# Patient Record
Sex: MALE | Race: WHITE | NOT HISPANIC OR LATINO | Employment: FULL TIME | URBAN - NONMETROPOLITAN AREA
[De-identification: names, ages, dates, MRNs, and addresses within clinical notes are randomized per-mention and may not be internally consistent; named-entity substitution may affect disease eponyms.]

---

## 2018-12-10 LAB — HBA1C MFR BLD HPLC: 5.3 %

## 2019-02-04 ENCOUNTER — OFFICE VISIT (OUTPATIENT)
Dept: INTERNAL MEDICINE CLINIC | Facility: CLINIC | Age: 44
End: 2019-02-04
Payer: COMMERCIAL

## 2019-02-04 VITALS
TEMPERATURE: 99.3 F | DIASTOLIC BLOOD PRESSURE: 70 MMHG | OXYGEN SATURATION: 98 % | WEIGHT: 221.1 LBS | HEART RATE: 69 BPM | SYSTOLIC BLOOD PRESSURE: 110 MMHG

## 2019-02-04 DIAGNOSIS — R20.2 NUMBNESS AND TINGLING IN BOTH HANDS: ICD-10-CM

## 2019-02-04 DIAGNOSIS — E78.2 MIXED HYPERLIPIDEMIA: Primary | ICD-10-CM

## 2019-02-04 DIAGNOSIS — Z87.39 HISTORY OF GOUT: ICD-10-CM

## 2019-02-04 DIAGNOSIS — K40.90 LEFT INGUINAL HERNIA: ICD-10-CM

## 2019-02-04 DIAGNOSIS — R20.0 NUMBNESS AND TINGLING IN BOTH HANDS: ICD-10-CM

## 2019-02-04 DIAGNOSIS — E55.9 VITAMIN D DEFICIENCY: ICD-10-CM

## 2019-02-04 PROCEDURE — 1036F TOBACCO NON-USER: CPT | Performed by: NURSE PRACTITIONER

## 2019-02-04 PROCEDURE — 99204 OFFICE O/P NEW MOD 45 MIN: CPT | Performed by: NURSE PRACTITIONER

## 2020-06-08 ENCOUNTER — TELEPHONE (OUTPATIENT)
Dept: INTERNAL MEDICINE CLINIC | Facility: CLINIC | Age: 45
End: 2020-06-08

## 2020-06-08 ENCOUNTER — OFFICE VISIT (OUTPATIENT)
Dept: INTERNAL MEDICINE CLINIC | Facility: CLINIC | Age: 45
End: 2020-06-08
Payer: COMMERCIAL

## 2020-06-08 VITALS
TEMPERATURE: 98.4 F | WEIGHT: 199.7 LBS | OXYGEN SATURATION: 98 % | HEIGHT: 73 IN | SYSTOLIC BLOOD PRESSURE: 108 MMHG | DIASTOLIC BLOOD PRESSURE: 72 MMHG | HEART RATE: 57 BPM | BODY MASS INDEX: 26.47 KG/M2
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
## 2020-06-08 DIAGNOSIS — S30.861A TICK BITE OF ABDOMEN, INITIAL ENCOUNTER: Primary | ICD-10-CM

## 2020-06-08 PROCEDURE — 99213 OFFICE O/P EST LOW 20 MIN: CPT | Performed by: NURSE PRACTITIONER

## 2020-06-08 PROCEDURE — 1036F TOBACCO NON-USER: CPT | Performed by: NURSE PRACTITIONER

## 2020-06-08 PROCEDURE — 3008F BODY MASS INDEX DOCD: CPT | Performed by: NURSE PRACTITIONER

## 2020-06-08 RX ORDER — DOXYCYCLINE 100 MG/1
100 CAPSULE ORAL 2 TIMES DAILY
Qty: 28 CAPSULE | Refills: 0 | Status: SHIPPED | OUTPATIENT
Start: 2020-06-08 | End: 2020-06-22

## 2020-07-07 ENCOUNTER — OFFICE VISIT (OUTPATIENT)
Dept: INTERNAL MEDICINE CLINIC | Facility: CLINIC | Age: 45
End: 2020-07-07
Payer: COMMERCIAL

## 2020-07-07 VITALS
DIASTOLIC BLOOD PRESSURE: 78 MMHG | OXYGEN SATURATION: 98 % | WEIGHT: 200 LBS | TEMPERATURE: 97.7 F | SYSTOLIC BLOOD PRESSURE: 118 MMHG | RESPIRATION RATE: 18 BRPM | HEART RATE: 67 BPM | BODY MASS INDEX: 26.39 KG/M2

## 2020-07-07 DIAGNOSIS — M10.211: Primary | ICD-10-CM

## 2020-07-07 DIAGNOSIS — K40.90 LEFT INGUINAL HERNIA: ICD-10-CM

## 2020-07-07 PROCEDURE — 1036F TOBACCO NON-USER: CPT | Performed by: NURSE PRACTITIONER

## 2020-07-07 PROCEDURE — 99213 OFFICE O/P EST LOW 20 MIN: CPT | Performed by: NURSE PRACTITIONER

## 2020-07-07 RX ORDER — COLCHICINE 0.6 MG/1
TABLET ORAL
Qty: 3 TABLET | Refills: 0 | Status: SHIPPED | OUTPATIENT
Start: 2020-07-07 | End: 2021-09-15 | Stop reason: ALTCHOICE

## 2020-07-07 NOTE — PATIENT INSTRUCTIONS
Gout   WHAT YOU NEED TO KNOW:   What is gout? Gout is a form of arthritis that causes severe joint pain and stiffness  Acute gout pain starts suddenly, gets worse quickly, and stops on its own  Acute gout can become chronic and cause permanent damage to your joints  What causes gout? Gout develops when uric acid builds up in your joints  Uric acid is made when your body breaks down purines  Purines are found in some medicines and foods  Your body gets rid of most uric acid through your urine  When your body cannot get rid of enough uric acid, it can build up and form crystals in your joints  The crystals cause your joints to become swollen and painful  This is called a gout attack  What increases my risk for gout? You may have been born with a decreased ability to break down and get rid of purines  Your body's ability to break down purines may be very slow  Gout is more common in men than in women  Any of the following can also increase your risk:  · A family history of gout    · Kidney disease or problems with how your kidneys work     · Eating foods that are high in purines, such as red meat    · Alcohol or tobacco use    · Diuretic medicine (water pills), or aspirin    · A medical condition such as diabetes, high blood pressure, or high cholesterol    · A condition such as an irregular heartbeat or a blood clot in your lungs  What are the stages of gout? · Hyperuricemia  is a high level of uric acid  Hyperuricemia is not gout, but it increases your risk for gout  You may have no symptoms at this stage, and it usually does not need treatment  · Acute gouty arthritis  starts with a sudden attack of pain and swelling, usually in 1 joint  This may be in your big toe  The attack may last from a few days to 2 weeks  · Intercritical gout  is the time between attacks  You may go months or years without another attack  You will not have joint pain or stiffness, but this does not mean your gout is cured  You will still need treatment to prevent chronic gout  · Chronic tophaceous gout  develops if gout is not treated  Large amounts of uric acid crystals, called tophi, collect around your joints  The crystals can destroy or deform the joints  Gout attacks occur more often, and last hours to weeks  More than 1 joint may be painful and swollen  At this stage, gout symptoms do not go away on their own  How is gout diagnosed? Your healthcare provider will ask about your medicines, health problems, and allergies  Tell him or her when your joint pain and swelling started  He or she will need to know if the swelling and pain were worst within 1 day or if got worse over time  He or she will check the skin over your joints for redness  You may also need any of the following:  · Blood tests  are used to check the level of uric acid  You may need to have blood tested more than once  · A synovial fluid test  is used to collect a sample of fluid from around your painful joint  The fluid is sent to a lab to check for uric acid crystals  Synovial fluid surrounds and protects your joints  · An x-ray, ultrasound, CT, or MRI  may show the gout or damage to bones caused by gout  You may be given contrast liquid to help your joints show up better in the pictures  Tell the healthcare provider if you have ever had an allergic reaction to contrast liquid  Do not enter the MRI room with anything metal  Metal can cause serious injury  Tell the healthcare provider if you have any metal in or on your body  How is gout treated? The following can make your symptoms stop sooner, prevent attacks, and decrease your risk for joint damage:  · Medicines:      ¨ NSAIDs , such as ibuprofen, help decrease swelling, pain, and fever  This medicine is available with or without a doctor's order  NSAIDs can cause stomach bleeding or kidney problems in certain people   If you take blood thinner medicine, always ask your healthcare provider if NSAIDs are safe for you  Always read the medicine label and follow directions  ¨ Gout medicine  decreases joint pain and swelling  It may also be given to prevent new gout attacks  ¨ Steroids  reduce inflammation and can help your joint stiffness and pain during gout attacks  ¨ Uric acid medicine  may be given to reduce the amount of uric acid your body makes  Some medicines may help you pass more uric acid when you urinate  · Surgery  called a bone graft may be needed for tophi that are painful or infected  Bone in the joint may be replaced with bone taken from another place in your body  Ask your healthcare provider for more information about bone graft surgery  How can I manage gout? · Rest your painful joint so it can heal   Your healthcare provider may recommend crutches or a walker if the affected joint is in a leg  · Apply ice to your joint  Ice decreases pain and swelling  Use an ice pack, or put crushed ice in a plastic bag  Cover the ice pack or bag with a towel before you apply it to your painful joint  Apply ice for 15 to 20 minutes every hour, or as directed  · Elevate your joint  Elevation helps reduce swelling and pain  Raise your joint above the level of your heart as often as you can  Prop your painful joint on pillows to keep it above your heart comfortably  · Go to physical therapy if directed  A physical therapist can teach you exercises to improve flexibility and range of motion  How can I prevent gout attacks? · Do not eat high-purine foods  These foods include meats, seafood, asparagus, spinach, cauliflower, and some types of beans  Healthcare providers may tell you to eat more low-fat milk products, such as yogurt  Milk products may decrease your risk for gout attacks  Vitamin C and coffee may also help  Your healthcare provider or dietitian can help you create a meal plan  · Drink more liquids as directed    Liquids such as water help remove uric acid from your body  Ask how much liquid to drink each day and which liquids are best for you  · Manage your weight  Weight loss may decrease the amount of uric acid in your body  Exercise can help you lose weight  Talk to your healthcare provider about the best exercises for you  · Control your blood sugar level if you have diabetes  Keep your blood sugar level in a normal range  This can help prevent gout attacks  · Limit or do not drink alcohol as directed  Alcohol can trigger a gout attack  Alcohol also increases your risk for dehydration  Ask your healthcare provider if alcohol is safe for you  When should I seek immediate care? · You have severe joint pain that you cannot tolerate  · You have a fever or redness that spreads beyond the joint area  When should I contact my healthcare provider? · You have a fever, chills, or body aches  · You are confused or more tired than usual      · You have new symptoms, such as a rash, after you start gout treatment  · Your joint pain and swelling do not go away, even after treatment  · You are not urinating as much or as often as you usually do  · You have trouble taking your gout medicines  CARE AGREEMENT:   You have the right to help plan your care  Learn about your health condition and how it may be treated  Discuss treatment options with your caregivers to decide what care you want to receive  You always have the right to refuse treatment  The above information is an  only  It is not intended as medical advice for individual conditions or treatments  Talk to your doctor, nurse or pharmacist before following any medical regimen to see if it is safe and effective for you  © 2017 [employer]  Information is for End User's use only and may not be sold, redistributed or otherwise used for commercial purposes   All illustrations and images included in CareNotes® are the copyrighted property of [employer]  or [facility] [employer]

## 2020-07-07 NOTE — PROGRESS NOTES
Assessment/Plan: Will start on Colchicine but area of concern is not typical area for gout  Patient was deferring any lab work right now  Will refer to Dr Rubi Brought for inguinal hernia  Patient will schedule appointment  He was advised to call office if symptoms are no better  No problem-specific Assessment & Plan notes found for this encounter  Problem List Items Addressed This Visit        Other    Left inguinal hernia    Relevant Orders    Ambulatory referral to General Surgery    Acute drug-induced gout of right shoulder - Primary    Relevant Medications    colchicine (COLCRYS) 0 6 mg tablet            Subjective:      Patient ID: Tahir Aguilar is a 40 y o  male  Rafy Holm is here today for an acute visit  He states he believes he does have gout to his right shoulder  It did start bothering him yesterday and was unable to move his arm  He states this has happened before in the past and does not feel he needs lab work  He states he has been drinking more beer lately and feels this triggered it  He states he does have a hernia to his left groin and it is burning and popping and would like to see Surgery for this  He offers no other issues  The following portions of the patient's history were reviewed and updated as appropriate:   He  has no past medical history on file  He   Patient Active Problem List    Diagnosis Date Noted    Acute drug-induced gout of right shoulder 07/07/2020    Tick bite of abdomen 06/08/2020    Mixed hyperlipidemia 02/04/2019    Numbness and tingling in both hands 02/04/2019    Vitamin D deficiency 02/04/2019    History of gout 02/04/2019    Left inguinal hernia 02/04/2019     He  has no past surgical history on file  His family history includes Gout in his brother and father; No Known Problems in his mother  He  reports that he has never smoked  He has never used smokeless tobacco  He reports that he drinks alcohol   He reports that he has current or past drug history  Drug: Marijuana  Current Outpatient Medications   Medication Sig Dispense Refill    colchicine (COLCRYS) 0 6 mg tablet Take two tablets by mouth now then one tablet in one hour if symptoms do not resolve hold for diarrhea 3 tablet 0     No current facility-administered medications for this visit  No current outpatient medications on file prior to visit  No current facility-administered medications on file prior to visit  He has No Known Allergies       Review of Systems   Constitutional: Negative  HENT: Negative  Eyes: Negative  Respiratory: Negative  Cardiovascular: Negative  Gastrointestinal: Negative  Endocrine: Negative  Genitourinary: Negative  Musculoskeletal: Negative  Right shoulder pain left inguinal hernia   Skin: Negative  Allergic/Immunologic: Negative  Neurological: Negative  Hematological: Negative  Psychiatric/Behavioral: Negative  Objective:      /78   Pulse 67   Temp 97 7 °F (36 5 °C)   Resp 18   Wt 90 7 kg (200 lb)   SpO2 98%   BMI 26 39 kg/m²          Physical Exam   Constitutional: He is oriented to person, place, and time  He appears well-developed and well-nourished  Cardiovascular: Normal rate, regular rhythm, normal heart sounds and intact distal pulses  Pulmonary/Chest: Effort normal and breath sounds normal    Musculoskeletal: Normal range of motion  He exhibits tenderness  Limited ROM of right arm with warmth noted over upper shoulder left inguinal hernia noted   Neurological: He is alert and oriented to person, place, and time  Skin: Skin is warm and dry  Capillary refill takes less than 2 seconds  Psychiatric: He has a normal mood and affect  His behavior is normal  Judgment and thought content normal    Vitals reviewed

## 2020-07-10 ENCOUNTER — TELEPHONE (OUTPATIENT)
Dept: INTERNAL MEDICINE CLINIC | Facility: CLINIC | Age: 45
End: 2020-07-10

## 2020-07-10 NOTE — TELEPHONE ENCOUNTER
Patient called stating his "gout" is much better but is not yet gone  He was wondering if you can call more meds in     Per our discussion patient declined bloodwork  You would like bloodwork done prior to sending anything else in  Patient stated he will see how it looks on Monday  If no better, he will call us to get bloodwork ordered

## 2020-07-13 ENCOUNTER — OFFICE VISIT (OUTPATIENT)
Dept: SURGERY | Facility: CLINIC | Age: 45
End: 2020-07-13
Payer: COMMERCIAL

## 2020-07-13 ENCOUNTER — APPOINTMENT (OUTPATIENT)
Dept: LAB | Facility: CLINIC | Age: 45
End: 2020-07-13
Payer: COMMERCIAL

## 2020-07-13 VITALS
WEIGHT: 195.6 LBS | HEART RATE: 75 BPM | BODY MASS INDEX: 25.92 KG/M2 | HEIGHT: 73 IN | DIASTOLIC BLOOD PRESSURE: 70 MMHG | SYSTOLIC BLOOD PRESSURE: 114 MMHG | TEMPERATURE: 75 F

## 2020-07-13 DIAGNOSIS — Z13.29 SCREENING FOR THYROID DISORDER: ICD-10-CM

## 2020-07-13 DIAGNOSIS — Z87.39 HISTORY OF GOUT: ICD-10-CM

## 2020-07-13 DIAGNOSIS — K40.90 LEFT INGUINAL HERNIA: ICD-10-CM

## 2020-07-13 DIAGNOSIS — Z13.6 SCREENING FOR CARDIOVASCULAR CONDITION: ICD-10-CM

## 2020-07-13 DIAGNOSIS — Z87.39 HISTORY OF GOUT: Primary | ICD-10-CM

## 2020-07-13 LAB
ALBUMIN SERPL BCP-MCNC: 4 G/DL (ref 3.5–5)
ALP SERPL-CCNC: 49 U/L (ref 46–116)
ALT SERPL W P-5'-P-CCNC: 26 U/L (ref 12–78)
ANION GAP SERPL CALCULATED.3IONS-SCNC: 4 MMOL/L (ref 4–13)
AST SERPL W P-5'-P-CCNC: 14 U/L (ref 5–45)
BASOPHILS # BLD AUTO: 0.04 THOUSANDS/ΜL (ref 0–0.1)
BASOPHILS NFR BLD AUTO: 1 % (ref 0–1)
BILIRUB SERPL-MCNC: 0.77 MG/DL (ref 0.2–1)
BUN SERPL-MCNC: 21 MG/DL (ref 5–25)
CALCIUM SERPL-MCNC: 9.3 MG/DL (ref 8.3–10.1)
CHLORIDE SERPL-SCNC: 104 MMOL/L (ref 100–108)
CHOLEST SERPL-MCNC: 183 MG/DL (ref 50–200)
CO2 SERPL-SCNC: 27 MMOL/L (ref 21–32)
CREAT SERPL-MCNC: 0.83 MG/DL (ref 0.6–1.3)
EOSINOPHIL # BLD AUTO: 0.09 THOUSAND/ΜL (ref 0–0.61)
EOSINOPHIL NFR BLD AUTO: 1 % (ref 0–6)
ERYTHROCYTE [DISTWIDTH] IN BLOOD BY AUTOMATED COUNT: 11.9 % (ref 11.6–15.1)
GFR SERPL CREATININE-BSD FRML MDRD: 107 ML/MIN/1.73SQ M
GLUCOSE P FAST SERPL-MCNC: 91 MG/DL (ref 65–99)
HCT VFR BLD AUTO: 42.7 % (ref 36.5–49.3)
HDLC SERPL-MCNC: 65 MG/DL
HGB BLD-MCNC: 14.3 G/DL (ref 12–17)
IMM GRANULOCYTES # BLD AUTO: 0.01 THOUSAND/UL (ref 0–0.2)
IMM GRANULOCYTES NFR BLD AUTO: 0 % (ref 0–2)
LDLC SERPL CALC-MCNC: 107 MG/DL (ref 0–100)
LYMPHOCYTES # BLD AUTO: 1.37 THOUSANDS/ΜL (ref 0.6–4.47)
LYMPHOCYTES NFR BLD AUTO: 19 % (ref 14–44)
MCH RBC QN AUTO: 30.1 PG (ref 26.8–34.3)
MCHC RBC AUTO-ENTMCNC: 33.5 G/DL (ref 31.4–37.4)
MCV RBC AUTO: 90 FL (ref 82–98)
MONOCYTES # BLD AUTO: 0.65 THOUSAND/ΜL (ref 0.17–1.22)
MONOCYTES NFR BLD AUTO: 9 % (ref 4–12)
NEUTROPHILS # BLD AUTO: 5.2 THOUSANDS/ΜL (ref 1.85–7.62)
NEUTS SEG NFR BLD AUTO: 70 % (ref 43–75)
NONHDLC SERPL-MCNC: 118 MG/DL
NRBC BLD AUTO-RTO: 0 /100 WBCS
PLATELET # BLD AUTO: 270 THOUSANDS/UL (ref 149–390)
PMV BLD AUTO: 9.8 FL (ref 8.9–12.7)
POTASSIUM SERPL-SCNC: 3.9 MMOL/L (ref 3.5–5.3)
PROT SERPL-MCNC: 7.7 G/DL (ref 6.4–8.2)
RBC # BLD AUTO: 4.75 MILLION/UL (ref 3.88–5.62)
SODIUM SERPL-SCNC: 135 MMOL/L (ref 136–145)
TRIGL SERPL-MCNC: 53 MG/DL
TSH SERPL DL<=0.05 MIU/L-ACNC: 1.88 UIU/ML (ref 0.36–3.74)
URATE SERPL-MCNC: 5.7 MG/DL (ref 4.2–8)
WBC # BLD AUTO: 7.36 THOUSAND/UL (ref 4.31–10.16)

## 2020-07-13 PROCEDURE — 84550 ASSAY OF BLOOD/URIC ACID: CPT

## 2020-07-13 PROCEDURE — 84443 ASSAY THYROID STIM HORMONE: CPT

## 2020-07-13 PROCEDURE — 85025 COMPLETE CBC W/AUTO DIFF WBC: CPT

## 2020-07-13 PROCEDURE — 80061 LIPID PANEL: CPT

## 2020-07-13 PROCEDURE — 36415 COLL VENOUS BLD VENIPUNCTURE: CPT

## 2020-07-13 PROCEDURE — 80053 COMPREHEN METABOLIC PANEL: CPT

## 2020-07-13 PROCEDURE — 99244 OFF/OP CNSLTJ NEW/EST MOD 40: CPT | Performed by: SURGERY

## 2020-07-14 ENCOUNTER — APPOINTMENT (OUTPATIENT)
Dept: RADIOLOGY | Facility: CLINIC | Age: 45
End: 2020-07-14
Payer: COMMERCIAL

## 2020-07-14 DIAGNOSIS — M25.511 ACUTE PAIN OF RIGHT SHOULDER: Primary | ICD-10-CM

## 2020-07-14 DIAGNOSIS — M25.511 ACUTE PAIN OF RIGHT SHOULDER: ICD-10-CM

## 2020-07-14 PROCEDURE — 73030 X-RAY EXAM OF SHOULDER: CPT

## 2020-07-14 NOTE — ASSESSMENT & PLAN NOTE
Symptomatic reducible left inguinal hernia  No evidence of incarceration strangulation  Patient does have a long history of a laborious work and heavy lifting  States he would like to have this fixed but now is not a good time  He wanted to get checked out and potentially plan for repair in the late fall or winter  I think this is reasonable  Signs and symptoms of incarceration strangulation were discussed  Patient instructed to go to the ER for any signs or symptoms should occur  He would like to further plan for surgery  The procedure itself including all associated risks and benefits were discussed the patient great length  The patient verbalized understands risks is willing proceed, consent was signed  For now patient will not require any additional preoperative workup  He will call us when he is ready to schedule surgery

## 2020-07-14 NOTE — PROGRESS NOTES
Assessment/Plan:    Left inguinal hernia  Symptomatic reducible left inguinal hernia  No evidence of incarceration strangulation  Patient does have a long history of a laborious work and heavy lifting  States he would like to have this fixed but now is not a good time  He wanted to get checked out and potentially plan for repair in the late fall or winter  I think this is reasonable  Signs and symptoms of incarceration strangulation were discussed  Patient instructed to go to the ER for any signs or symptoms should occur  He would like to further plan for surgery  The procedure itself including all associated risks and benefits were discussed the patient great length  The patient verbalized understands risks is willing proceed, consent was signed  For now patient will not require any additional preoperative workup  He will call us when he is ready to schedule surgery  Diagnoses and all orders for this visit:    Left inguinal hernia  -     Ambulatory referral to General Surgery          Subjective:      Patient ID: Claudy Marroquin is a 40 y o  male  45-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 with known history of gout a currently undergoing treatment for acute potential acute flare, presents today in consultation for evaluation of a symptomatic left inguinal hernia  Patient states he has had the hernia for some time and recently though in the last few weeks is become more bothersome  He states there is a bulge in the left groin which when lying down can be popped back in  He states that comes out and causes discomfort with increased activity especially lifting  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   He describes his discomfort is more of a burning which does not radiate and is localized to the left groin  Denies any nausea vomiting  No fevers or chills  No changes in bowel or bladder habits  Passing flatus and having bowel movements without difficulty  Denies any shortness of breath or chest pain  The following portions of the patient's history were reviewed and updated as appropriate:   He  has no past medical history on file  He   Patient Active Problem List    Diagnosis Date Noted    Acute drug-induced gout of right shoulder 07/07/2020    Tick bite of abdomen 06/08/2020    Mixed hyperlipidemia 02/04/2019    Numbness and tingling in both hands 02/04/2019    Vitamin D deficiency 02/04/2019    History of gout 02/04/2019    Left inguinal hernia 02/04/2019     He  has no past surgical history on file  His family history includes Gout in his brother and father; No Known Problems in his mother  He  reports that he has never smoked  He has never used smokeless tobacco  He reports that he drinks alcohol  He reports that he has current or past drug history  Drug: Marijuana  Current Outpatient Medications   Medication Sig Dispense Refill    colchicine (COLCRYS) 0 6 mg tablet Take two tablets by mouth now then one tablet in one hour if symptoms do not resolve hold for diarrhea (Patient not taking: Reported on 7/13/2020) 3 tablet 0     No current facility-administered medications for this visit  He has No Known Allergies       Review of Systems      Review systems was completed, all negative except as noted above HPI  Objective:      /70 (BP Location: Right arm, Patient Position: Sitting, Cuff Size: Adult)   Pulse 75   Temp (!) 75 °F (23 9 °C) (Tympanic)   Ht 6' 1" (1 854 m)   Wt 88 7 kg (195 lb 9 6 oz)   BMI 25 81 kg/m²          Physical Exam   Constitutional: He is oriented to person, place, and time  He appears well-developed and well-nourished  No distress  HENT:   Head: Normocephalic and atraumatic  Eyes: No scleral icterus  Neck: Normal range of motion  Neck supple  No tracheal deviation present  Cardiovascular: Normal rate, regular rhythm and normal heart sounds  Exam reveals no gallop and no friction rub     No murmur heard   Pulmonary/Chest: Effort normal and breath sounds normal  No stridor  No respiratory distress  He has no wheezes  He has no rales  He exhibits no tenderness  Abdominal: Soft  He exhibits no distension and no mass  There is tenderness (Left groin)  There is no rebound and no guarding  A hernia is present  Hernia confirmed positive in the left inguinal area ( reducible left inguinal hernia)  Hernia confirmed negative in the right inguinal area  Musculoskeletal: Normal range of motion  He exhibits no edema, tenderness or deformity  Lymphadenopathy:     He has no cervical adenopathy  No inguinal adenopathy noted on the right or left side  Neurological: He is alert and oriented to person, place, and time  No cranial nerve deficit  Coordination normal    Skin: Skin is warm  No rash noted  He is not diaphoretic  No erythema  No pallor  Psychiatric: He has a normal mood and affect  His behavior is normal  Judgment and thought content normal    Vitals reviewed

## 2020-07-15 ENCOUNTER — OFFICE VISIT (OUTPATIENT)
Dept: OBGYN CLINIC | Facility: CLINIC | Age: 45
End: 2020-07-15
Payer: COMMERCIAL

## 2020-07-15 VITALS
HEIGHT: 73 IN | SYSTOLIC BLOOD PRESSURE: 115 MMHG | HEART RATE: 54 BPM | WEIGHT: 197.2 LBS | DIASTOLIC BLOOD PRESSURE: 79 MMHG | BODY MASS INDEX: 26.14 KG/M2

## 2020-07-15 DIAGNOSIS — M25.511 CHRONIC RIGHT SHOULDER PAIN: Primary | ICD-10-CM

## 2020-07-15 DIAGNOSIS — G89.29 CHRONIC RIGHT SHOULDER PAIN: Primary | ICD-10-CM

## 2020-07-15 PROCEDURE — 3008F BODY MASS INDEX DOCD: CPT | Performed by: PHYSICAL MEDICINE & REHABILITATION

## 2020-07-15 PROCEDURE — 99203 OFFICE O/P NEW LOW 30 MIN: CPT | Performed by: PHYSICAL MEDICINE & REHABILITATION

## 2020-07-15 PROCEDURE — 20610 DRAIN/INJ JOINT/BURSA W/O US: CPT | Performed by: PHYSICAL MEDICINE & REHABILITATION

## 2020-07-15 PROCEDURE — 1036F TOBACCO NON-USER: CPT | Performed by: PHYSICAL MEDICINE & REHABILITATION

## 2020-07-15 RX ORDER — LIDOCAINE HYDROCHLORIDE 10 MG/ML
5 INJECTION, SOLUTION INFILTRATION; PERINEURAL
Status: COMPLETED | OUTPATIENT
Start: 2020-07-15 | End: 2020-07-15

## 2020-07-15 RX ORDER — TRIAMCINOLONE ACETONIDE 40 MG/ML
80 INJECTION, SUSPENSION INTRA-ARTICULAR; INTRAMUSCULAR
Status: COMPLETED | OUTPATIENT
Start: 2020-07-15 | End: 2020-07-15

## 2020-07-15 RX ADMIN — TRIAMCINOLONE ACETONIDE 80 MG: 40 INJECTION, SUSPENSION INTRA-ARTICULAR; INTRAMUSCULAR at 15:05

## 2020-07-15 RX ADMIN — LIDOCAINE HYDROCHLORIDE 5 ML: 10 INJECTION, SOLUTION INFILTRATION; PERINEURAL at 15:05

## 2020-07-15 NOTE — PROGRESS NOTES
1  Chronic right shoulder pain  Large joint arthrocentesis: R subacromial bursa     Orders Placed This Encounter   Procedures    Large joint arthrocentesis: R subacromial bursa        Imaging Studies (I personally reviewed images in PACS and report):  Right shoulder x-rays most recent to this encounter reviewed  These images show slight loss of glenohumeral joint space which is consistent with mild glenohumeral joint osteoarthritis  There is also  Impression:  Right shoulder pain likely secondary to rotator cuff derangement/calcific tendinopathy  We discussed different treatment options and decided to proceed with a subacromial space steroid injection  He had some improvement is in symptoms afterwards  He will take naproxen standing for the next 5 days and then as needed afterward to help with inflammation  He will also start physical therapy  I will see him back in about 3-4 weeks or earlier if needed  Return in about 3 weeks (around 8/5/2020)  HPI:  Vicente Jhaveri is a 40 y o  male  who presents for evaluation of   Chief Complaint   Patient presents with    Right Shoulder - Pain       Onset/Mechanism: R shoulder pain woke patient up from sleep 10 days ago  Location: R lateral shoulder  Radiation: Distally to elbow  Quality: Aching/throbbing with associated sharp/stabbing pain with movement  Provocative: Flexion and abduction of the shoulder, as well as sleeping on shoulder  Severity: 2/10 at rest, 8/10 with movement  Associated Symptoms: Weakness and stiffenss  Treatment so far: Naproxen with no relief, aspirin with moderate relief  Review of Systems   Constitutional: Positive for activity change  Negative for fever  HENT: Negative for sore throat  Eyes: Negative for visual disturbance  Respiratory: Negative for shortness of breath  Cardiovascular: Negative for chest pain  Gastrointestinal: Negative for abdominal pain  Endocrine: Negative for polydipsia     Genitourinary: Negative for difficulty urinating  Musculoskeletal: Positive for arthralgias  Skin: Negative for rash  Allergic/Immunologic: Negative for immunocompromised state  Neurological: Negative for numbness  Hematological: Does not bruise/bleed easily  Psychiatric/Behavioral: Negative for confusion  Following history reviewed and updated:  History reviewed  No pertinent past medical history  History reviewed  No pertinent surgical history  Social History   Social History     Substance and Sexual Activity   Alcohol Use Yes     Social History     Substance and Sexual Activity   Drug Use Yes    Types: Marijuana     Social History     Tobacco Use   Smoking Status Never Smoker   Smokeless Tobacco Never Used     Family History   Problem Relation Age of Onset    No Known Problems Mother     Gout Father     Gout Brother      No Known Allergies     Constitutional:  /79   Pulse (!) 54   Ht 6' 1" (1 854 m)   Wt 89 4 kg (197 lb 3 2 oz)   BMI 26 02 kg/m²    General: NAD  Eyes: Anicteric sclerae  Neck: Supple  Lungs: Unlabored breathing  Cardiovascular: No lower extremity edema  Skin: Intact without erythema  Neurologic: Sensation intact to light touch  Psychiatric: Mood and affect are appropriate  Right Shoulder Exam     Tenderness   The patient is experiencing tenderness in the acromion  Range of Motion   Active abduction:  100 abnormal   Passive abduction: normal   Forward flexion:  100 abnormal     Muscle Strength   Abduction: 2/5   Internal rotation: 2/5     Tests   Valadez test: positive  Impingement: positive  Sulcus: absent    Other   Erythema: absent  Scars: absent  Sensation: normal    Comments:  Positive painful arc sign  Injection site was clean, dry and intact               Large joint arthrocentesis: R subacromial bursa  Date/Time: 7/15/2020 3:05 PM  Consent given by: patient  Site marked: site marked  Supporting Documentation  Indications: pain   Procedure Details  Location: shoulder - R subacromial bursa  Needle size: 22 G  Ultrasound guidance: no  Approach: posterolateral  Medications administered: 5 mL lidocaine 1 %; 80 mg triamcinolone acetonide 40 mg/mL    Patient tolerance: patient tolerated the procedure well with no immediate complications  Dressing:  Sterile dressing applied           Portions of the record may have been created with voice recognition software  Occasional wrong word or "sound a like" substitutions may have occurred due to the inherent limitations of voice recognition software  Read the chart carefully and recognize, using context, where substitutions have occurred

## 2020-08-07 ENCOUNTER — OFFICE VISIT (OUTPATIENT)
Dept: OBGYN CLINIC | Facility: CLINIC | Age: 45
End: 2020-08-07
Payer: COMMERCIAL

## 2020-08-07 VITALS
WEIGHT: 197 LBS | HEART RATE: 82 BPM | SYSTOLIC BLOOD PRESSURE: 122 MMHG | BODY MASS INDEX: 26.11 KG/M2 | HEIGHT: 73 IN | DIASTOLIC BLOOD PRESSURE: 79 MMHG

## 2020-08-07 DIAGNOSIS — M75.31 CALCIFIC TENDINITIS OF RIGHT SHOULDER: Primary | ICD-10-CM

## 2020-08-07 DIAGNOSIS — G89.29 CHRONIC RIGHT SHOULDER PAIN: ICD-10-CM

## 2020-08-07 DIAGNOSIS — M25.511 CHRONIC RIGHT SHOULDER PAIN: ICD-10-CM

## 2020-08-07 PROCEDURE — 99213 OFFICE O/P EST LOW 20 MIN: CPT | Performed by: PHYSICAL MEDICINE & REHABILITATION

## 2020-08-07 PROCEDURE — 1036F TOBACCO NON-USER: CPT | Performed by: PHYSICAL MEDICINE & REHABILITATION

## 2020-08-07 PROCEDURE — 3008F BODY MASS INDEX DOCD: CPT | Performed by: PHYSICAL MEDICINE & REHABILITATION

## 2020-08-07 NOTE — PROGRESS NOTES
1  Calcific tendinitis of right shoulder     2  Chronic right shoulder pain       No orders of the defined types were placed in this encounter  Imaging Studies (I personally reviewed images in PACS and report):  Right shoulder x-rays most recent to this encounter reviewed  These images show slight loss of glenohumeral joint space which is consistent with mild glenohumeral joint osteoarthritis  There is also a radiodensity that is consistent with calcific tendinopathy      Impression:  Right shoulder pain likely secondary to rotator cuff derangement/calcific tendinopathy  The patient had a steroid injection to the subacromial space on his last visit with me  He has full range of motion and full strength with all shoulder motions now  He gets some twinges of pain and there depending on what he is doing but is not limited  He did not start physical therapy and does not feel like he needs this  We will hold off on this for now  I will see him back if needed  Can consider ultrasound-guided barbotage in the future  Return if symptoms worsen or fail to improve  HPI:  Helena Vasquez is a 40 y o  male  who presents in follow up  Here for No chief complaint on file  Date of injury:  Right shoulder pain that started in early July without a specific incident  Trajectory of symptoms: Doing very well  See above  Review of Systems   Constitutional: Positive for activity change  Negative for fever  HENT: Negative for sore throat  Eyes: Negative for visual disturbance  Respiratory: Negative for shortness of breath  Cardiovascular: Negative for chest pain  Gastrointestinal: Negative for abdominal pain  Endocrine: Negative for polydipsia  Genitourinary: Negative for difficulty urinating  Musculoskeletal: Positive for arthralgias  Skin: Negative for rash  Allergic/Immunologic: Negative for immunocompromised state  Neurological: Negative for numbness     Hematological: Does not bruise/bleed easily  Psychiatric/Behavioral: Negative for confusion  Following history reviewed and updated:  History reviewed  No pertinent past medical history  History reviewed  No pertinent surgical history  Social History   Social History     Substance and Sexual Activity   Alcohol Use Yes     Social History     Substance and Sexual Activity   Drug Use Yes    Types: Marijuana     Social History     Tobacco Use   Smoking Status Never Smoker   Smokeless Tobacco Never Used     Family History   Problem Relation Age of Onset    No Known Problems Mother     Gout Father     Gout Brother      No Known Allergies     Constitutional:  /79   Pulse 82   Ht 6' 1" (1 854 m)   Wt 89 4 kg (197 lb)   BMI 25 99 kg/m²    General: NAD  Eyes: Clear sclerae  ENT: No inflammation, lesion, or mass of lips  No tracheal deviation  Musculoskeletal: As mentioned below  Integumentary: No visible rashes or skin lesions  Pulmonary/Chest: Effort normal  No respiratory distress  Neuro: CN's grossly intact, SCHROEDER  Psych: Normal affect and judgement  Vascular: WWP  Right Shoulder Exam     Tenderness   The patient is experiencing no tenderness  Range of Motion   The patient has normal right shoulder ROM  Muscle Strength   The patient has normal right shoulder strength  Tests   Valadez test: negative  Impingement: negative    Other   Erythema: absent  Scars: absent  Sensation: normal  Pulse: present             Procedures - none for this visit  Portions of the record may have been created with voice recognition software  Occasional wrong word or "sound a like" substitutions may have occurred due to the inherent limitations of voice recognition software  Read the chart carefully and recognize, using context, where substitutions have occurred

## 2020-12-14 ENCOUNTER — TELEMEDICINE (OUTPATIENT)
Dept: INTERNAL MEDICINE CLINIC | Facility: CLINIC | Age: 45
End: 2020-12-14
Payer: COMMERCIAL

## 2020-12-14 VITALS — TEMPERATURE: 96.5 F | HEART RATE: 82 BPM | OXYGEN SATURATION: 99 %

## 2020-12-14 DIAGNOSIS — Z20.822 EXPOSURE TO COVID-19 VIRUS: Primary | ICD-10-CM

## 2020-12-14 PROCEDURE — 99213 OFFICE O/P EST LOW 20 MIN: CPT | Performed by: NURSE PRACTITIONER

## 2020-12-14 PROCEDURE — U0003 INFECTIOUS AGENT DETECTION BY NUCLEIC ACID (DNA OR RNA); SEVERE ACUTE RESPIRATORY SYNDROME CORONAVIRUS 2 (SARS-COV-2) (CORONAVIRUS DISEASE [COVID-19]), AMPLIFIED PROBE TECHNIQUE, MAKING USE OF HIGH THROUGHPUT TECHNOLOGIES AS DESCRIBED BY CMS-2020-01-R: HCPCS | Performed by: NURSE PRACTITIONER

## 2020-12-15 LAB — SARS-COV-2 RNA SPEC QL NAA+PROBE: NOT DETECTED

## 2021-03-26 ENCOUNTER — TELEMEDICINE (OUTPATIENT)
Dept: INTERNAL MEDICINE CLINIC | Facility: CLINIC | Age: 46
End: 2021-03-26
Payer: COMMERCIAL

## 2021-03-26 VITALS — OXYGEN SATURATION: 99 % | HEART RATE: 92 BPM

## 2021-03-26 DIAGNOSIS — Z20.822 EXPOSURE TO COVID-19 VIRUS: Primary | ICD-10-CM

## 2021-03-26 PROCEDURE — U0003 INFECTIOUS AGENT DETECTION BY NUCLEIC ACID (DNA OR RNA); SEVERE ACUTE RESPIRATORY SYNDROME CORONAVIRUS 2 (SARS-COV-2) (CORONAVIRUS DISEASE [COVID-19]), AMPLIFIED PROBE TECHNIQUE, MAKING USE OF HIGH THROUGHPUT TECHNOLOGIES AS DESCRIBED BY CMS-2020-01-R: HCPCS | Performed by: NURSE PRACTITIONER

## 2021-03-26 PROCEDURE — U0005 INFEC AGEN DETEC AMPLI PROBE: HCPCS | Performed by: NURSE PRACTITIONER

## 2021-03-26 PROCEDURE — 99213 OFFICE O/P EST LOW 20 MIN: CPT | Performed by: NURSE PRACTITIONER

## 2021-03-26 NOTE — PROGRESS NOTES
COVID-19 Virtual Visit     Assessment/Plan: Patient did positive exposure  Patient is not having symptoms  Will obtain covid swab today  Did advise to self isolate  Will notify once swab is back  Problem List Items Addressed This Visit        Other    Exposure to COVID-19 virus - Primary    Relevant Orders    Novel Coronavirus (Covid-19),PCR [facility] - Collected in Office         Disposition:     I have spent 15 minutes directly with the patient  Encounter provider [id number], [address] [facility]    Provider located at [address] [place] [address]  [address] [facility]  [id number]    Recent Visits  No visits were found meeting these conditions  Showing recent visits within past 7 days and meeting all other requirements     Today's Visits  Date Type Provider Dept   03/26/21 Telemedicine [facility]   Showing today's visits and meeting all other requirements     Future Appointments  No visits were found meeting these conditions  Showing future appointments within next 150 days and meeting all other requirements        Patient agrees to participate in a virtual check in via telephone or video visit instead of presenting to the office to address urgent/immediate medical needs  Patient is aware this is a billable service  After connecting through Telephone, the patient was identified by name and date of birth  Arsenio Solomon was informed that this was a telemedicine visit and that the exam was being conducted confidentially over secure lines  My office door was closed  No one else was in the room  Arsenio Solomon acknowledged consent and understanding of privacy and security of the telemedicine visit  I informed the patient that I have reviewed his record in Epic and presented the opportunity for him to ask any questions regarding the visit today  The patient agreed to participate      It was my intent to perform this visit via video technology but the patient was not able to do a video connection so the visit was completed via audio telephone only  Subjective: Maria L Burger is a 39 y o  male who is concerned about COVID-19  Patient is currently asymptomatic  Patient denies fever, chills, fatigue, malaise, congestion, rhinorrhea, sore throat, anosmia, loss of taste, cough, shortness of breath, chest tightness, abdominal pain, nausea, vomiting, diarrhea, myalgias and headaches  Date of exposure: 3/19/2021    Exposure:   Contact with a person who is under investigation (PUI) for or who is positive for COVID-19 within the last 14 days?: Yes    Hospitalized recently for fever and/or lower respiratory symptoms?: No      Currently a healthcare worker that is involved in direct patient care?: No      Works in a special setting where the risk of COVID-19 transmission may be high? (this may include long-term care, correctional and shelter facilities; homeless shelters; assisted-living facilities and group homes ): No      Resident in a special setting where the risk of COVID-19 transmission may be high? (this may include long-term care, correctional and shelter facilities; homeless shelters; assisted-living facilities and group homes ): No      Lab Results   Component Value Date    SARSCOV2 Not Detected 12/14/2020     No past medical history on file  No past surgical history on file  Current Outpatient Medications   Medication Sig Dispense Refill    colchicine (COLCRYS) 0 6 mg tablet Take two tablets by mouth now then one tablet in one hour if symptoms do not resolve hold for diarrhea (Patient not taking: Reported on 7/13/2020) 3 tablet 0     No current facility-administered medications for this visit  No Known Allergies    Review of Systems   Constitutional: Negative for chills, fatigue and fever  HENT: Negative for congestion, rhinorrhea and sore throat      Respiratory: Negative for cough, chest tightness and shortness of breath  Gastrointestinal: Negative for abdominal pain, diarrhea, nausea and vomiting  Musculoskeletal: Negative for myalgias  Neurological: Negative for headaches  Objective:    Vitals:    03/26/21 0754   Pulse: 92   SpO2: 99%       Physical Exam  Constitutional:       Appearance: Normal appearance  He is normal weight  Neurological:      Mental Status: He is alert  Psychiatric:         Mood and Affect: Mood normal          Behavior: Behavior normal          Thought Content: Thought content normal          Judgment: Judgment normal        VIRTUAL VISIT DISCLAIMER    Tammy Johnson acknowledges that he has consented to an online visit or consultation  He understands that the online visit is based solely on information provided by him, and that, in the absence of a face-to-face physical evaluation by the physician, the diagnosis he receives is both limited and provisional in terms of accuracy and completeness  This is not intended to replace a full medical face-to-face evaluation by the physician  Tammy Johnson understands and accepts these terms

## 2021-03-27 LAB — SARS-COV-2 RNA RESP QL NAA+PROBE: NEGATIVE

## 2021-09-15 ENCOUNTER — OFFICE VISIT (OUTPATIENT)
Dept: INTERNAL MEDICINE CLINIC | Facility: CLINIC | Age: 46
End: 2021-09-15
Payer: COMMERCIAL

## 2021-09-15 VITALS
HEART RATE: 59 BPM | SYSTOLIC BLOOD PRESSURE: 110 MMHG | HEIGHT: 73 IN | OXYGEN SATURATION: 97 % | BODY MASS INDEX: 30.22 KG/M2 | WEIGHT: 228 LBS | TEMPERATURE: 97.3 F | DIASTOLIC BLOOD PRESSURE: 62 MMHG

## 2021-09-15 DIAGNOSIS — I83.12 VARICOSE VEINS OF BOTH LOWER EXTREMITIES WITH INFLAMMATION: Primary | ICD-10-CM

## 2021-09-15 DIAGNOSIS — L03.116 CELLULITIS OF LEFT LOWER EXTREMITY: ICD-10-CM

## 2021-09-15 DIAGNOSIS — Z23 NEED FOR TDAP VACCINATION: ICD-10-CM

## 2021-09-15 DIAGNOSIS — I83.11 VARICOSE VEINS OF BOTH LOWER EXTREMITIES WITH INFLAMMATION: Primary | ICD-10-CM
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
## 2021-09-15 PROBLEM — Z20.822 EXPOSURE TO COVID-19 VIRUS: Status: RESOLVED | Noted: 2020-12-14 | Resolved: 2021-09-15

## 2021-09-15 PROBLEM — S30.861A TICK BITE OF ABDOMEN: Status: RESOLVED | Noted: 2020-06-08 | Resolved: 2021-09-15

## 2021-09-15 PROCEDURE — 99213 OFFICE O/P EST LOW 20 MIN: CPT | Performed by: NURSE PRACTITIONER

## 2021-09-15 PROCEDURE — 3725F SCREEN DEPRESSION PERFORMED: CPT | Performed by: NURSE PRACTITIONER

## 2021-09-15 PROCEDURE — 90471 IMMUNIZATION ADMIN: CPT | Performed by: NURSE PRACTITIONER

## 2021-09-15 PROCEDURE — 90715 TDAP VACCINE 7 YRS/> IM: CPT | Performed by: NURSE PRACTITIONER

## 2021-09-15 RX ORDER — CEPHALEXIN 500 MG/1
500 CAPSULE ORAL EVERY 8 HOURS SCHEDULED
Qty: 30 CAPSULE | Refills: 0 | Status: SHIPPED | OUTPATIENT
Start: 2021-09-15 | End: 2021-09-25

## 2021-09-15 NOTE — PROGRESS NOTES
Assessment/Plan: Will start on Keflex take as directed  Did advise if any worsening of symptoms to call the office  Will follow up with him next week for a recheck  Will give Adacel today  No problem-specific Assessment & Plan notes found for this encounter  Problem List Items Addressed This Visit        Cardiovascular and Mediastinum    Varicose veins of both lower extremities with inflammation - Primary       Other    BMI 30 0-30 9,adult    Cellulitis of left lower extremity    Relevant Medications    cephalexin (KEFLEX) 500 mg capsule      Other Visit Diagnoses     Need for Tdap vaccination        Relevant Orders    TDAP VACCINE GREATER THAN OR EQUAL TO 6YO IM (Completed)            Subjective:      Patient ID: Siomara Stauffer is a 39 y o  male  Cassandria Dandy is for an acute visit  He states on his left lower leg for around 8 months he has had these lesions  He has bad varicose veins and did see vascular for them  He did apply a wound medication to his leg and this made the area worse  He states it is red and has a black center and does not see seem to be healing  He also would like a tetanus shot  He denies any fever  He offers no other issues  The following portions of the patient's history were reviewed and updated as appropriate:   He  has no past medical history on file  He   Patient Active Problem List    Diagnosis Date Noted    BMI 30 0-30 9,adult 09/15/2021    Varicose veins of both lower extremities with inflammation 09/15/2021    Cellulitis of left lower extremity 09/15/2021    Calcific tendinitis of right shoulder 08/07/2020    Chronic right shoulder pain 07/15/2020    Acute drug-induced gout of right shoulder 07/07/2020    Mixed hyperlipidemia 02/04/2019    Numbness and tingling in both hands 02/04/2019    Vitamin D deficiency 02/04/2019    History of gout 02/04/2019    Left inguinal hernia 02/04/2019     He  has no past surgical history on file    His family history includes Gout in his brother and father; No Known Problems in his mother  He  reports that he has never smoked  He has never used smokeless tobacco  He reports current alcohol use  He reports current drug use  Drug: Marijuana  Current Outpatient Medications   Medication Sig Dispense Refill    cephalexin (KEFLEX) 500 mg capsule Take 1 capsule (500 mg total) by mouth every 8 (eight) hours for 10 days 30 capsule 0     No current facility-administered medications for this visit  Current Outpatient Medications on File Prior to Visit   Medication Sig    [DISCONTINUED] colchicine (COLCRYS) 0 6 mg tablet Take two tablets by mouth now then one tablet in one hour if symptoms do not resolve hold for diarrhea (Patient not taking: Reported on 7/13/2020)     No current facility-administered medications on file prior to visit  He has No Known Allergies       Review of Systems   Skin: Positive for wound  All other systems reviewed and are negative  Objective:      /62 (BP Location: Left arm, Patient Position: Sitting, Cuff Size: Large)   Pulse 59   Temp (!) 97 3 °F (36 3 °C) (Temporal)   Ht 6' 1" (1 854 m)   Wt 103 kg (228 lb)   SpO2 97%   BMI 30 08 kg/m²          Physical Exam  Vitals reviewed  Constitutional:       Appearance: Normal appearance  He is normal weight  Cardiovascular:      Rate and Rhythm: Normal rate and regular rhythm  Pulses: Normal pulses  Heart sounds: Normal heart sounds  Pulmonary:      Effort: Pulmonary effort is normal       Breath sounds: Normal breath sounds  Musculoskeletal:         General: Normal range of motion  Skin:     General: Skin is warm and dry  Capillary Refill: Capillary refill takes less than 2 seconds  Findings: Erythema present  Comments: To left lower leg with black center noted no drainage noted   Neurological:      General: No focal deficit present  Mental Status: He is alert and oriented to person, place, and time   Mental status is at baseline  Psychiatric:         Mood and Affect: Mood normal          Behavior: Behavior normal          Thought Content: Thought content normal          Judgment: Judgment normal          BMI Counseling: Body mass index is 30 08 kg/m²  The BMI is above normal  Nutrition recommendations include reducing portion sizes, decreasing overall calorie intake, 3-5 servings of fruits/vegetables daily, reducing fast food intake, consuming healthier snacks, decreasing soda and/or juice intake, moderation in carbohydrate intake, increasing intake of lean protein, reducing intake of saturated fat and trans fat and reducing intake of cholesterol

## 2021-09-15 NOTE — PATIENT INSTRUCTIONS
Low Fat Diet   AMBULATORY CARE:   A low-fat diet  is an eating plan that is low in total fat, unhealthy fat, and cholesterol  You may need to follow a low-fat diet if you have trouble digesting or absorbing fat  You may also need to follow this diet if you have high cholesterol  You can also lower your cholesterol by increasing the amount of fiber in your diet  Soluble fiber is a type of fiber that helps to decrease cholesterol levels  Different types of fat in food:   · Limit unhealthy fats  A diet that is high in cholesterol, saturated fat, and trans fat may cause unhealthy cholesterol levels  Unhealthy cholesterol levels increase your risk of heart disease  ? Cholesterol:  Limit intake of cholesterol to less than 200 mg per day  Cholesterol is found in meat, eggs, and dairy  ? Saturated fat:  Limit saturated fat to less than 7% of your total daily calories  Ask your dietitian how many calories you need each day  Saturated fat is found in butter, cheese, ice cream, whole milk, and palm oil  Saturated fat is also found in meat, such as beef, pork, chicken skin, and processed meats  Processed meats include sausage, hot dogs, and bologna  ? Trans fat:  Avoid trans fat as much as possible  Trans fat is used in fried and baked foods  Foods that say trans fat free on the label may still have up to 0 5 grams of trans fat per serving  · Include healthy fats  Replace foods that are high in saturated and trans fat with foods high in healthy fats  This may help to decrease high cholesterol levels  ? Monounsaturated fats: These are found in avocados, nuts, and vegetable oils, such as olive, canola, and sunflower oil  ? Polyunsaturated fats: These can be found in vegetable oils, such as soybean or corn oil  Omega-3 fats can help to decrease the risk of heart disease  Omega-3 fats are found in fish, such as salmon, herring, trout, and tuna   Omega-3 fats can also be found in plant foods, such as walnuts, flaxseed, soybeans, and canola oil  Foods to limit or avoid:   · Grains:      ? Snacks that are made with partially hydrogenated oils, such as chips, regular crackers, and butter-flavored popcorn    ? High-fat baked goods, such as biscuits, croissants, doughnuts, pies, cookies, and pastries    · Dairy:      ? Whole milk, 2% milk, and yogurt and ice cream made with whole milk    ? Half and half creamer, heavy cream, and whipping cream    ? Cheese, cream cheese, and sour cream    · Meats and proteins:      ? High-fat cuts of meat (T-bone steak, regular hamburger, and ribs)    ? Fried meat, poultry (turkey and chicken), and fish    ? Poultry (chicken and turkey) with skin    ? Cold cuts (salami or bologna), hot dogs, cherry, and sausage    ? Whole eggs and egg yolks    · Vegetables and fruits with added fat:      ? Fried vegetables or vegetables in butter or high-fat sauces, such as cream or cheese sauces    ? Fried fruit or fruit served with butter or cream    · Fats:      ? Butter, stick margarine, and shortening    ? Coconut, palm oil, and palm kernel oil    Foods to include:   · Grains:      ? Whole-grain breads, cereals, pasta, and brown rice    ? Low-fat crackers and pretzels    · Vegetables and fruits:      ? Fresh, frozen, or canned vegetables (no salt or low-sodium)    ? Fresh, frozen, dried, or canned fruit (canned in light syrup or fruit juice)    ? Avocado    · Low-fat dairy products:      ? Nonfat (skim) or 1% milk    ? Nonfat or low-fat cheese, yogurt, and cottage cheese    · Meats and proteins:      ? Chicken or turkey with no skin    ? Baked or broiled fish    ? Lean beef and pork (loin, round, extra lean hamburger)    ? Beans and peas, unsalted nuts, soy products    ? Egg whites and substitutes    ? Seeds and nuts    · Fats:      ? Unsaturated oil, such as canola, olive, peanut, soybean, or sunflower oil    ? Soft or liquid margarine and vegetable oil spread    ?  Low-fat salad dressing    Other ways to decrease fat:   · Read food labels before you buy foods  Choose foods that have less than 30% of calories from fat  Choose low-fat or fat-free dairy products  Remember that fat free does not mean calorie free  These foods still contain calories, and too many calories can lead to weight gain  · Trim fat from meat and avoid fried food  Trim all visible fat from meat before you cook it  Remove the skin from poultry  Do not cerda meat, fish, or poultry  Bake, roast, boil, or broil these foods instead  Avoid fried foods  Eat a baked potato instead of [place] fries  Steam vegetables instead of sautéing them in butter  · Add less fat to foods  Use imitation cherry bits on salads and baked potatoes instead of regular cherry bits  Use fat-free or low-fat salad dressings instead of regular dressings  Use low-fat or nonfat butter-flavored topping instead of regular butter or margarine on popcorn and other foods  Ways to decrease fat in recipes:  Replace high-fat ingredients with low-fat or nonfat ones  This may cause baked goods to be drier than usual  You may need to use nonfat cooking spray on pans to prevent food from sticking  You also may need to change the amount of other ingredients, such as water, in the recipe  Try the following:  · Use low-fat or light margarine instead of regular margarine or shortening  · Use lean ground turkey breast or chicken, or lean ground beef (less than 5% fat) instead of hamburger  · Add 1 teaspoon of canola oil to 8 ounces of skim milk instead of using cream or half and half  · Use grated zucchini, carrots, or apples in breads instead of coconut  · Use blenderized, low-fat cottage cheese, plain tofu, or low-fat ricotta cheese instead of cream cheese  · Use 1 egg white and 1 teaspoon of canola oil, or use ¼ cup (2 ounces) of fat-free egg substitute instead of a whole egg       · Replace half of the oil that is called for in a recipe with applesauce when you bake  Use 3 tablespoons of cocoa powder and 1 tablespoon of canola oil instead of a square of baking chocolate  How to increase fiber:  Eat enough high-fiber foods to get 20 to 30 grams of fiber every day  Slowly increase your fiber intake to avoid stomach cramps, gas, and other problems  · Eat 3 ounces of whole-grain foods each day  An ounce is about 1 slice of bread  Eat whole-grain breads, such as whole-wheat bread  Whole wheat, whole-wheat flour, or other whole grains should be listed as the first ingredient on the food label  Replace white flour with whole-grain flour or use half of each in recipes  Whole-grain flour is heavier than white flour, so you may have to add more yeast or baking powder  · Eat a high-fiber cereal for breakfast   Oatmeal is a good source of soluble fiber  Look for cereals that have bran or fiber in the name  Choose whole-grain products, such as brown rice, barley, and whole-wheat pasta  · Eat more beans, peas, and lentils  For example, add beans to soups or salads  Eat at least 5 cups of fruits and vegetables each day  Eat fruits and vegetables with the peel because the peel is high in fiber  © Copyright [employer] 2021 Information is for End User's use only and may not be sold, redistributed or otherwise used for commercial purposes  All illustrations and images included in CareNotes® are the copyrighted property of A D A M , Inc  or [address] [place], [place] [address]eyad   The above information is an  only  It is not intended as medical advice for individual conditions or treatments  Talk to your doctor, nurse or pharmacist before following any medical regimen to see if it is safe and effective for you  Heart Healthy Diet   AMBULATORY CARE:   A heart healthy diet  is an eating plan low in unhealthy fats and sodium (salt)  The plan is high in healthy fats and fiber   A heart healthy diet helps improve your cholesterol levels and lowers your risk for heart disease and stroke  A dietitian will teach you how to read and understand food labels  Heart healthy diet guidelines to follow:   · Choose foods that contain healthy fats  ? Unsaturated fats  include monounsaturated and polyunsaturated fats  Unsaturated fat is found in foods such as soybean, canola, olive, corn, and safflower oils  It is also found in soft tub margarine that is made with liquid vegetable oil  ? Omega-3 fat  is found in certain fish, such as salmon, tuna, and trout, and in walnuts and flaxseed  Eat fish high in omega-3 fats at least 2 times a week  · Get 20 to 30 grams of fiber each day  Fruits, vegetables, whole-grain foods, and legumes (cooked beans) are good sources of fiber  · Limit or do not have unhealthy fats  ? Cholesterol  is found in animal foods, such as eggs and lobster, and in dairy products made from whole milk  Limit cholesterol to less than 200 mg each day  ? Saturated fat  is found in meats, such as cherry and hamburger  It is also found in chicken or turkey skin, whole milk, and butter  Limit saturated fat to less than 7% of your total daily calories  ? Trans fat  is found in packaged foods, such as potato chips and cookies  It is also in hard margarine, some fried foods, and shortening  Do not eat foods that contain trans fats  · Limit sodium as directed  You may be told to limit sodium to 2,000 to 2,300 mg each day  Choose low-sodium or no-salt-added foods  Add little or no salt to food you prepare  Use herbs and spices in place of salt  Include the following in your heart healthy plan:  Ask your dietitian or healthcare provider how many servings to have from each of the following food groups:  · Grains:      ? Whole-wheat breads, cereals, and pastas, and brown rice    ? Low-fat, low-sodium crackers and chips    · Vegetables:      ? Broccoli, green beans, green peas, and spinach    ? Collards, kale, and lima beans    ?  Carrots, sweet potatoes, tomatoes, and peppers    ? Canned vegetables with no salt added    · Fruits:      ? Bananas, peaches, pears, and pineapple    ? Grapes, raisins, and dates    ? Oranges, tangerines, grapefruit, orange juice, and grapefruit juice    ? Apricots, mangoes, melons, and papaya    ? Raspberries and strawberries    ? Canned fruit with no added sugar    · Low-fat dairy:      ? Nonfat (skim) milk, 1% milk, and low-fat almond, cashew, or soy milks fortified with calcium    ? Low-fat cheese, regular or frozen yogurt, and cottage cheese    · Meats and proteins:      ? Lean cuts of beef and pork (loin, leg, round), skinless chicken and turkey    ? Legumes, soy products, egg whites, or nuts    Limit or do not include the following in your heart healthy plan:   · Unhealthy fats and oils:      ? Whole or 2% milk, cream cheese, sour cream, or cheese    ? High-fat cuts of beef (T-bone steaks, ribs), chicken or turkey with skin, and organ meats such as liver    ? Butter, stick margarine, shortening, and cooking oils such as coconut or palm oil    · Foods and liquids high in sodium:      ? Packaged foods, such as frozen dinners, cookies, macaroni and cheese, and cereals with more than 300 mg of sodium per serving    ? Vegetables with added sodium, such as instant potatoes, vegetables with added sauces, or regular canned vegetables    ? Cured or smoked meats, such as hot dogs, cherry, and sausage    ? High-sodium ketchup, barbecue sauce, salad dressing, pickles, olives, soy sauce, or miso    · Foods and liquids high in sugar:      ? Candy, cake, cookies, pies, or doughnuts    ? Soft drinks (soda), sports drinks, or sweetened tea    ? Canned or dry mixes for cakes, soups, sauces, or gravies    Other healthy heart guidelines:   · Do not smoke  Nicotine and other chemicals in cigarettes and cigars can cause lung and heart damage  Ask your healthcare provider for information if you currently smoke and need help to quit  E-cigarettes or smokeless tobacco still contain nicotine  Talk to your healthcare provider before you use these products  · Limit or do not drink alcohol as directed  Alcohol can damage your heart and raise your blood pressure  Your healthcare provider may give you specific daily and weekly limits  The general recommended limit is 1 drink a day for women 21 or older and for men 72 or older  Do not have more than 3 drinks in a day or 7 in a week  The recommended limit is 2 drinks a day for men 24to 59years of age  Do not have more than 4 drinks in a day or 14 in a week  A drink of alcohol is 12 ounces of beer, 5 ounces of wine, or 1½ ounces of liquor  · Exercise regularly  Exercise can help you maintain a healthy weight and improve your blood pressure and cholesterol levels  Regular exercise can also decrease your risk for heart problems  Ask your healthcare provider about the best exercise plan for you  Do not start an exercise program without asking your healthcare provider  Follow up with your doctor or cardiologist as directed:  Write down your questions so you remember to ask them during your visits  © Copyright [employer] 2021 Information is for End User's use only and may not be sold, redistributed or otherwise used for commercial purposes  All illustrations and images included in CareNotes® are the copyrighted property of A D A M , Inc  or [facility]   The above information is an  only  It is not intended as medical advice for individual conditions or treatments  Talk to your doctor, nurse or pharmacist before following any medical regimen to see if it is safe and effective for you

## 2021-09-27 ENCOUNTER — OFFICE VISIT (OUTPATIENT)
Dept: INTERNAL MEDICINE CLINIC | Facility: CLINIC | Age: 46
End: 2021-09-27
Payer: COMMERCIAL

## 2021-09-27 VITALS
HEART RATE: 74 BPM | OXYGEN SATURATION: 98 % | TEMPERATURE: 96.9 F | BODY MASS INDEX: 30.06 KG/M2 | HEIGHT: 73 IN | DIASTOLIC BLOOD PRESSURE: 78 MMHG | SYSTOLIC BLOOD PRESSURE: 106 MMHG | WEIGHT: 226.8 LBS

## 2021-09-27 DIAGNOSIS — L03.116 CELLULITIS OF LEFT LOWER EXTREMITY: Primary | ICD-10-CM

## 2021-09-27 PROBLEM — Z23 NEED FOR TDAP VACCINATION: Status: RESOLVED | Noted: 2021-09-15 | Resolved: 2021-09-27

## 2021-09-27 PROCEDURE — 99213 OFFICE O/P EST LOW 20 MIN: CPT | Performed by: NURSE PRACTITIONER

## 2021-09-27 PROCEDURE — 3008F BODY MASS INDEX DOCD: CPT | Performed by: NURSE PRACTITIONER

## 2021-09-27 PROCEDURE — 1036F TOBACCO NON-USER: CPT | Performed by: NURSE PRACTITIONER

## 2021-09-27 NOTE — PROGRESS NOTES
Assessment/Plan: Did advise patient to finish Keflex and will send to [facility] for further evaluation  Will give him number in [facility]  Will follow up as needed  No problem-specific Assessment & Plan notes found for this encounter  Problem List Items Addressed This Visit        Other    Cellulitis of left lower extremity - Primary            Subjective:      Patient ID: Alberto Steiner is a 39 y o  male  Chinobenoit Rosarioer is for a leg recheck  He was seen in the 9/15 with complaints of a small open area to his lower leg with varicose veins  He is almost done with his keflex and states the area is better but the black scabbed area never does go away  He states it is feeling better but does have some pain on and off  He denies any fever  He offers no other issues  The following portions of the patient's history were reviewed and updated as appropriate: He  has no past medical history on file  He   Patient Active Problem List    Diagnosis Date Noted    BMI 30 0-30 9,adult 09/15/2021    Varicose veins of both lower extremities with inflammation 09/15/2021    Cellulitis of left lower extremity 09/15/2021    Calcific tendinitis of right shoulder 08/07/2020    Chronic right shoulder pain 07/15/2020    Acute drug-induced gout of right shoulder 07/07/2020    Mixed hyperlipidemia 02/04/2019    Numbness and tingling in both hands 02/04/2019    Vitamin D deficiency 02/04/2019    History of gout 02/04/2019    Left inguinal hernia 02/04/2019     He  has no past surgical history on file  His family history includes Gout in his brother and father; No Known Problems in his mother  He  reports that he has never smoked  He has never used smokeless tobacco  He reports current alcohol use  He reports current drug use  Drug: Marijuana  No current outpatient medications on file  No current facility-administered medications for this visit  No current outpatient medications on file prior to visit       No current facility-administered medications on file prior to visit  He has No Known Allergies       Review of Systems   Skin: Positive for wound  All other systems reviewed and are negative  Objective: There were no vitals taken for this visit  Physical Exam  Vitals reviewed  Constitutional:       Appearance: Normal appearance  He is normal weight  Cardiovascular:      Rate and Rhythm: Normal rate and regular rhythm  Pulses: Normal pulses  Heart sounds: Normal heart sounds  Pulmonary:      Effort: Pulmonary effort is normal       Breath sounds: Normal breath sounds  Musculoskeletal:         General: Normal range of motion  Skin:     General: Skin is warm and dry  Capillary Refill: Capillary refill takes less than 2 seconds  Comments: Reddened areas noted to left lower leg with scabbed areas noted, varicose veins noted   Neurological:      General: No focal deficit present  Mental Status: He is alert and oriented to person, place, and time  Mental status is at baseline  Psychiatric:         Mood and Affect: Mood normal          Behavior: Behavior normal          Thought Content:  Thought content normal          Judgment: Judgment normal

## 2022-01-03 ENCOUNTER — TELEPHONE (OUTPATIENT)
Dept: INTERNAL MEDICINE CLINIC | Facility: CLINIC | Age: 47
End: 2022-01-03

## 2022-01-03 NOTE — TELEPHONE ENCOUNTER
Started with ST and runny nose yesterday  Rafy Holm wanted to know the current suggestion on if he should get tested or not  His daughter is in from out of [place]  He states he's away from everyone now  I did let him know the test is taking much longer to get back at this point  He wanted to know what he should do  Should he get tested, should he stay away from everyone?      did get his booster : 12/22/21    Did speak with Fifi Andrews, she stated that he should let us know how he's doing tomorrow since it's only a st and runny nose, that it could be anything  He will call back if symptoms worsen

## 2022-01-31 ENCOUNTER — OFFICE VISIT (OUTPATIENT)
Dept: SURGERY | Facility: CLINIC | Age: 47
End: 2022-01-31
Payer: COMMERCIAL

## 2022-01-31 VITALS
DIASTOLIC BLOOD PRESSURE: 72 MMHG | SYSTOLIC BLOOD PRESSURE: 107 MMHG | HEART RATE: 78 BPM | TEMPERATURE: 98.8 F | HEIGHT: 73 IN | WEIGHT: 232 LBS | BODY MASS INDEX: 30.75 KG/M2

## 2022-01-31 DIAGNOSIS — K40.90 LEFT INGUINAL HERNIA: Primary | ICD-10-CM

## 2022-01-31 PROCEDURE — 99214 OFFICE O/P EST MOD 30 MIN: CPT | Performed by: SURGERY

## 2022-02-01 RX ORDER — SODIUM CHLORIDE, SODIUM LACTATE, POTASSIUM CHLORIDE, CALCIUM CHLORIDE 600; 310; 30; 20 MG/100ML; MG/100ML; MG/100ML; MG/100ML
125 INJECTION, SOLUTION INTRAVENOUS CONTINUOUS
Status: CANCELLED | OUTPATIENT
Start: 2022-03-01

## 2022-02-01 RX ORDER — CHLORHEXIDINE GLUCONATE 4 G/100ML
SOLUTION TOPICAL DAILY PRN
Status: CANCELLED | OUTPATIENT
Start: 2022-02-01

## 2022-02-01 RX ORDER — CEFAZOLIN SODIUM 2 G/50ML
2000 SOLUTION INTRAVENOUS ONCE
Status: CANCELLED | OUTPATIENT
Start: 2022-03-01 | End: 2022-02-01

## 2022-02-01 RX ORDER — HEPARIN SODIUM 5000 [USP'U]/ML
5000 INJECTION, SOLUTION INTRAVENOUS; SUBCUTANEOUS ONCE
Status: CANCELLED | OUTPATIENT
Start: 2022-03-01 | End: 2022-02-01

## 2022-02-01 NOTE — PROGRESS NOTES
Assessment/Plan:    Left inguinal hernia  Symptomatic left inguinal hernia  Patient now wished to have this repaired  He will be scheduled for left inguinal hernia repair with mesh  The procedure self including all associated risks and benefits discussed the patient great length  The patient verbalized understands risks is willing proceed, consent was signed  No additional preop workup required       Diagnoses and all orders for this visit:    Left inguinal hernia          Subjective:      Patient ID: Jola Bernheim is a 55 y o  male  55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 with no significant past medical history presents today for follow-up  Patient is known to me was seen in July of 2020 with complaints of a symptomatic left inguinal hernia  That time patient wished not to proceed with surgery  However since that time he states that the hernia is become slightly larger and now more symptomatic  Recently states that the pain is become slightly more frequent and more intense  He feels now that time to go and get this repaired as it is going to continue to cause some discomfort  The pain is localized can be sharp within the left groin  No overlying skin changes  Denies nausea vomiting fevers or chills  No changes in bowel bladder habits  Denies any worsening shortness of breath or chest pain  The following portions of the patient's history were reviewed and updated as appropriate:   He  has no past medical history on file    He   Patient Active Problem List    Diagnosis Date Noted    BMI 30 0-30 9,adult 09/15/2021    Varicose veins of both lower extremities with inflammation 09/15/2021    Cellulitis of left lower extremity 09/15/2021    Calcific tendinitis of right shoulder 08/07/2020    Chronic right shoulder pain 07/15/2020    Acute drug-induced gout of right shoulder 07/07/2020    Mixed hyperlipidemia 02/04/2019    Numbness and tingling in both hands 02/04/2019    Vitamin D deficiency 02/04/2019    History of gout 02/04/2019    Left inguinal hernia 02/04/2019     He  has no past surgical history on file  His family history includes Gout in his brother and father; No Known Problems in his mother  He  reports that he has never smoked  He has never used smokeless tobacco  He reports current alcohol use  He reports current drug use  Drug: Marijuana  No current outpatient medications on file  No current facility-administered medications for this visit  He has No Known Allergies       Review of Systems      Review systems completed, all negative except as noted above HPI  Objective:      /72   Pulse 78   Temp 98 8 °F (37 1 °C)   Ht 6' 1" (1 854 m)   Wt 105 kg (232 lb)   BMI 30 61 kg/m²          Physical Exam  Vitals reviewed  Constitutional:       General: He is not in acute distress  Appearance: Normal appearance  He is normal weight  He is not toxic-appearing or diaphoretic  HENT:      Head: Normocephalic and atraumatic  Right Ear: External ear normal       Left Ear: External ear normal    Eyes:      General: No scleral icterus  Right eye: No discharge  Left eye: No discharge  Cardiovascular:      Rate and Rhythm: Normal rate and regular rhythm  Heart sounds: Normal heart sounds  No murmur heard  No friction rub  No gallop  Pulmonary:      Effort: Pulmonary effort is normal  No respiratory distress  Breath sounds: Normal breath sounds  No stridor  No wheezing or rhonchi  Abdominal:      General: Abdomen is flat  There is no distension  Palpations: Abdomen is soft  There is no mass  Tenderness: There is no abdominal tenderness  There is no guarding or rebound  Hernia: A hernia is present  Hernia is present in the left inguinal area  There is no hernia in the right inguinal area  Musculoskeletal:         General: No swelling, deformity or signs of injury  Normal range of motion        Cervical back: Normal range of motion and neck supple  Right lower leg: No edema  Left lower leg: No edema  Lymphadenopathy:      Lower Body: No right inguinal adenopathy  No left inguinal adenopathy  Skin:     General: Skin is warm and dry  Coloration: Skin is not jaundiced or pale  Findings: No bruising or erythema  Neurological:      General: No focal deficit present  Mental Status: He is alert and oriented to person, place, and time  Psychiatric:         Mood and Affect: Mood normal          Behavior: Behavior normal          Thought Content:  Thought content normal          Judgment: Judgment normal

## 2022-02-01 NOTE — H&P (VIEW-ONLY)
Assessment/Plan:    Left inguinal hernia  Symptomatic left inguinal hernia  Patient now wished to have this repaired  He will be scheduled for left inguinal hernia repair with mesh  The procedure self including all associated risks and benefits discussed the patient great length  The patient verbalized understands risks is willing proceed, consent was signed  No additional preop workup required       Diagnoses and all orders for this visit:    Left inguinal hernia          Subjective:      Patient ID: Terra Bragg is a 55 y o  male  55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 with no significant past medical history presents today for follow-up  Patient is known to me was seen in July of 2020 with complaints of a symptomatic left inguinal hernia  That time patient wished not to proceed with surgery  However since that time he states that the hernia is become slightly larger and now more symptomatic  Recently states that the pain is become slightly more frequent and more intense  He feels now that time to go and get this repaired as it is going to continue to cause some discomfort  The pain is localized can be sharp within the left groin  No overlying skin changes  Denies nausea vomiting fevers or chills  No changes in bowel bladder habits  Denies any worsening shortness of breath or chest pain  The following portions of the patient's history were reviewed and updated as appropriate:   He  has no past medical history on file    He   Patient Active Problem List    Diagnosis Date Noted    BMI 30 0-30 9,adult 09/15/2021    Varicose veins of both lower extremities with inflammation 09/15/2021    Cellulitis of left lower extremity 09/15/2021    Calcific tendinitis of right shoulder 08/07/2020    Chronic right shoulder pain 07/15/2020    Acute drug-induced gout of right shoulder 07/07/2020    Mixed hyperlipidemia 02/04/2019    Numbness and tingling in both hands 02/04/2019    Vitamin D deficiency 02/04/2019    History of gout 02/04/2019    Left inguinal hernia 02/04/2019     He  has no past surgical history on file  His family history includes Gout in his brother and father; No Known Problems in his mother  He  reports that he has never smoked  He has never used smokeless tobacco  He reports current alcohol use  He reports current drug use  Drug: Marijuana  No current outpatient medications on file  No current facility-administered medications for this visit  He has No Known Allergies       Review of Systems      Review systems completed, all negative except as noted above HPI  Objective:      /72   Pulse 78   Temp 98 8 °F (37 1 °C)   Ht 6' 1" (1 854 m)   Wt 105 kg (232 lb)   BMI 30 61 kg/m²          Physical Exam  Vitals reviewed  Constitutional:       General: He is not in acute distress  Appearance: Normal appearance  He is normal weight  He is not toxic-appearing or diaphoretic  HENT:      Head: Normocephalic and atraumatic  Right Ear: External ear normal       Left Ear: External ear normal    Eyes:      General: No scleral icterus  Right eye: No discharge  Left eye: No discharge  Cardiovascular:      Rate and Rhythm: Normal rate and regular rhythm  Heart sounds: Normal heart sounds  No murmur heard  No friction rub  No gallop  Pulmonary:      Effort: Pulmonary effort is normal  No respiratory distress  Breath sounds: Normal breath sounds  No stridor  No wheezing or rhonchi  Abdominal:      General: Abdomen is flat  There is no distension  Palpations: Abdomen is soft  There is no mass  Tenderness: There is no abdominal tenderness  There is no guarding or rebound  Hernia: A hernia is present  Hernia is present in the left inguinal area  There is no hernia in the right inguinal area  Musculoskeletal:         General: No swelling, deformity or signs of injury  Normal range of motion        Cervical back: Normal range of motion and neck supple  Right lower leg: No edema  Left lower leg: No edema  Lymphadenopathy:      Lower Body: No right inguinal adenopathy  No left inguinal adenopathy  Skin:     General: Skin is warm and dry  Coloration: Skin is not jaundiced or pale  Findings: No bruising or erythema  Neurological:      General: No focal deficit present  Mental Status: He is alert and oriented to person, place, and time  Psychiatric:         Mood and Affect: Mood normal          Behavior: Behavior normal          Thought Content:  Thought content normal          Judgment: Judgment normal

## 2022-02-01 NOTE — H&P
Assessment/Plan:    Left inguinal hernia  Symptomatic left inguinal hernia  Patient now wished to have this repaired  He will be scheduled for left inguinal hernia repair with mesh  The procedure self including all associated risks and benefits discussed the patient great length  The patient verbalized understands risks is willing proceed, consent was signed  No additional preop workup required       Diagnoses and all orders for this visit:    Left inguinal hernia          Subjective:      Patient ID: Karen Das is a 55 y o  male  55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 with no significant past medical history presents today for follow-up  Patient is known to me was seen in July of 2020 with complaints of a symptomatic left inguinal hernia  That time patient wished not to proceed with surgery  However since that time he states that the hernia is become slightly larger and now more symptomatic  Recently states that the pain is become slightly more frequent and more intense  He feels now that time to go and get this repaired as it is going to continue to cause some discomfort  The pain is localized can be sharp within the left groin  No overlying skin changes  Denies nausea vomiting fevers or chills  No changes in bowel bladder habits  Denies any worsening shortness of breath or chest pain  The following portions of the patient's history were reviewed and updated as appropriate:   He  has no past medical history on file    He   Patient Active Problem List    Diagnosis Date Noted    BMI 30 0-30 9,adult 09/15/2021    Varicose veins of both lower extremities with inflammation 09/15/2021    Cellulitis of left lower extremity 09/15/2021    Calcific tendinitis of right shoulder 08/07/2020    Chronic right shoulder pain 07/15/2020    Acute drug-induced gout of right shoulder 07/07/2020    Mixed hyperlipidemia 02/04/2019    Numbness and tingling in both hands 02/04/2019    Vitamin D deficiency 02/04/2019    History of gout 02/04/2019    Left inguinal hernia 02/04/2019     He  has no past surgical history on file  His family history includes Gout in his brother and father; No Known Problems in his mother  He  reports that he has never smoked  He has never used smokeless tobacco  He reports current alcohol use  He reports current drug use  Drug: Marijuana  No current outpatient medications on file  No current facility-administered medications for this visit  He has No Known Allergies       Review of Systems      Review systems completed, all negative except as noted above HPI  Objective:      /72   Pulse 78   Temp 98 8 °F (37 1 °C)   Ht 6' 1" (1 854 m)   Wt 105 kg (232 lb)   BMI 30 61 kg/m²          Physical Exam  Vitals reviewed  Constitutional:       General: He is not in acute distress  Appearance: Normal appearance  He is normal weight  He is not toxic-appearing or diaphoretic  HENT:      Head: Normocephalic and atraumatic  Right Ear: External ear normal       Left Ear: External ear normal    Eyes:      General: No scleral icterus  Right eye: No discharge  Left eye: No discharge  Cardiovascular:      Rate and Rhythm: Normal rate and regular rhythm  Heart sounds: Normal heart sounds  No murmur heard  No friction rub  No gallop  Pulmonary:      Effort: Pulmonary effort is normal  No respiratory distress  Breath sounds: Normal breath sounds  No stridor  No wheezing or rhonchi  Abdominal:      General: Abdomen is flat  There is no distension  Palpations: Abdomen is soft  There is no mass  Tenderness: There is no abdominal tenderness  There is no guarding or rebound  Hernia: A hernia is present  Hernia is present in the left inguinal area  There is no hernia in the right inguinal area  Musculoskeletal:         General: No swelling, deformity or signs of injury  Normal range of motion        Cervical back: Normal range of motion and neck supple  Right lower leg: No edema  Left lower leg: No edema  Lymphadenopathy:      Lower Body: No right inguinal adenopathy  No left inguinal adenopathy  Skin:     General: Skin is warm and dry  Coloration: Skin is not jaundiced or pale  Findings: No bruising or erythema  Neurological:      General: No focal deficit present  Mental Status: He is alert and oriented to person, place, and time  Psychiatric:         Mood and Affect: Mood normal          Behavior: Behavior normal          Thought Content:  Thought content normal          Judgment: Judgment normal

## 2022-02-01 NOTE — ASSESSMENT & PLAN NOTE
Symptomatic left inguinal hernia  Patient now wished to have this repaired  He will be scheduled for left inguinal hernia repair with mesh  The procedure self including all associated risks and benefits discussed the patient great length  The patient verbalized understands risks is willing proceed, consent was signed    No additional preop workup required

## 2022-02-23 NOTE — PRE-PROCEDURE INSTRUCTIONS
No outpatient medications have been marked as taking for the 3/1/22 encounter [facility] Encounter)  Pt does not take any daily medications  My Surgical Experience    The following information was developed to assist you to prepare for your operation  What do I need to do before coming to the hospital?   Arrange for a responsible person to drive you to and from the hospital    Arrange care for your children at home  Children are not allowed in the recovery areas of the hospital   Plan to wear clothing that is easy to put on and take off  If you are having shoulder surgery, wear a shirt that buttons or zippers in the front  Bathing  o Shower the evening before and the morning of your surgery with an antibacterial soap  Please refer to the Pre Op Showering Instructions for Surgery Patients Sheet   o Remove nail polish and all body piercing jewelry  o Do not shave any body part for at least 24 hours before surgery-this includes face, arms, legs and upper body  Food  o Nothing to eat or drink after midnight the night before your surgery  This includes candy and chewing gum  o Exception: If your surgery is after 12:00pm (noon), you may have clear liquids such as 7-Up®, ginger ale, apple or cranberry juice, Jell-O®, water, or clear broth until 8:00 am  o Do not drink milk or juice with pulp on the morning before surgery  o Do not drink alcohol 24 hours before surgery  Medicine  o Follow instructions you received from your surgeon about which medicines you may take on the day of surgery  o If instructed to take medicine on the morning of surgery, take pills with just a small sip of water  Call your prescribing doctor for specific infroamtion on what to do if you take insulin    What should I bring to the hospital?    Bring:  Oksana Germain or a walker, if you have them, for foot or knee surgery   A list of the daily medicines, vitamins, minerals, herbals and nutritional supplements you take   Include the dosages of medicines and the time you take them each day   Glasses, dentures or hearing aids   Minimal clothing; you will be wearing hospital sleepwear   Photo ID; required to verify your identity   If you have a Living Will or Power of , bring a copy of the documents   If you have an ostomy, bring an extra pouch and any supplies you use    Do not bring   Medicines or inhalers   Money, valuables or jewelry    What other information should I know about the day of surgery?  Notify your surgeons if you develop a cold, sore throat, cough, fever, rash or any other illness   Report to the Ambulatory Surgical/Same Day Surgery Unit   You will be instructed to stop at Registration only if you have not been pre-registered   Inform your  fi they do not stay that they will be asked by the staff to leave a phone number where they can be reached   Be available to be reached before surgery  In the event the operating room schedule changes, you may be asked to come in earlier or later than expected    *It is important to tell your doctor and others involved in your health care if you are taking or have been taking any non-prescription drugs, vitamins, minerals, herbals or other nutritional supplements   Any of these may interact with some food or medicines and cause a reaction

## 2022-03-01 ENCOUNTER — ANESTHESIA EVENT (OUTPATIENT)
Dept: PERIOP | Facility: HOSPITAL | Age: 47
End: 2022-03-01
Payer: COMMERCIAL

## 2022-03-01 ENCOUNTER — ANESTHESIA (OUTPATIENT)
Dept: PERIOP | Facility: HOSPITAL | Age: 47
End: 2022-03-01
Payer: COMMERCIAL

## 2022-03-01 ENCOUNTER — HOSPITAL ENCOUNTER (OUTPATIENT)
Facility: HOSPITAL | Age: 47
Setting detail: OUTPATIENT SURGERY
Discharge: HOME | End: 2022-03-01
Attending: SURGERY | Admitting: SURGERY
Payer: COMMERCIAL

## 2022-03-01 VITALS
BODY MASS INDEX: 29.82 KG/M2 | HEART RATE: 64 BPM | SYSTOLIC BLOOD PRESSURE: 115 MMHG | TEMPERATURE: 98.9 F | WEIGHT: 225 LBS | RESPIRATION RATE: 16 BRPM | DIASTOLIC BLOOD PRESSURE: 59 MMHG | OXYGEN SATURATION: 95 % | HEIGHT: 73 IN

## 2022-03-01 DIAGNOSIS — K40.90 LEFT INGUINAL HERNIA: Primary | ICD-10-CM

## 2022-03-01 PROCEDURE — C1781 MESH (IMPLANTABLE): HCPCS | Performed by: SURGERY

## 2022-03-01 PROCEDURE — 49507 PRP I/HERN INIT BLOCK >5 YR: CPT | Performed by: SURGERY

## 2022-03-01 PROCEDURE — 49507 PRP I/HERN INIT BLOCK >5 YR: CPT | Performed by: PHYSICIAN ASSISTANT

## 2022-03-01 DEVICE — BARD MESH PERFIX PLUG, LARGE
Type: IMPLANTABLE DEVICE | Status: FUNCTIONAL
Brand: BARD MESH PERFIX PLUG

## 2022-03-01 RX ORDER — DEXAMETHASONE SODIUM PHOSPHATE 4 MG/ML
INJECTION, SOLUTION INTRA-ARTICULAR; INTRALESIONAL; INTRAMUSCULAR; INTRAVENOUS; SOFT TISSUE AS NEEDED
Status: DISCONTINUED | OUTPATIENT
Start: 2022-03-01 | End: 2022-03-01

## 2022-03-01 RX ORDER — SODIUM CHLORIDE, SODIUM LACTATE, POTASSIUM CHLORIDE, CALCIUM CHLORIDE 600; 310; 30; 20 MG/100ML; MG/100ML; MG/100ML; MG/100ML
125 INJECTION, SOLUTION INTRAVENOUS CONTINUOUS
Status: DISCONTINUED | OUTPATIENT
Start: 2022-03-01 | End: 2022-03-01 | Stop reason: HOSPADM

## 2022-03-01 RX ORDER — OXYCODONE HYDROCHLORIDE AND ACETAMINOPHEN 5; 325 MG/1; MG/1
2 TABLET ORAL EVERY 4 HOURS PRN
Status: DISCONTINUED | OUTPATIENT
Start: 2022-03-01 | End: 2022-03-01 | Stop reason: HOSPADM

## 2022-03-01 RX ORDER — PROMETHAZINE HYDROCHLORIDE 25 MG/ML
25 INJECTION, SOLUTION INTRAMUSCULAR; INTRAVENOUS ONCE AS NEEDED
Status: DISCONTINUED | OUTPATIENT
Start: 2022-03-01 | End: 2022-03-01 | Stop reason: HOSPADM

## 2022-03-01 RX ORDER — SODIUM CHLORIDE, SODIUM LACTATE, POTASSIUM CHLORIDE, CALCIUM CHLORIDE 600; 310; 30; 20 MG/100ML; MG/100ML; MG/100ML; MG/100ML
INJECTION, SOLUTION INTRAVENOUS CONTINUOUS PRN
Status: DISCONTINUED | OUTPATIENT
Start: 2022-03-01 | End: 2022-03-01

## 2022-03-01 RX ORDER — PROPOFOL 10 MG/ML
INJECTION, EMULSION INTRAVENOUS AS NEEDED
Status: DISCONTINUED | OUTPATIENT
Start: 2022-03-01 | End: 2022-03-01

## 2022-03-01 RX ORDER — GLYCOPYRROLATE 0.2 MG/ML
INJECTION INTRAMUSCULAR; INTRAVENOUS AS NEEDED
Status: DISCONTINUED | OUTPATIENT
Start: 2022-03-01 | End: 2022-03-01

## 2022-03-01 RX ORDER — MAGNESIUM HYDROXIDE 1200 MG/15ML
LIQUID ORAL AS NEEDED
Status: DISCONTINUED | OUTPATIENT
Start: 2022-03-01 | End: 2022-03-01 | Stop reason: HOSPADM

## 2022-03-01 RX ORDER — CHLORHEXIDINE GLUCONATE 4 G/100ML
SOLUTION TOPICAL DAILY PRN
Status: DISCONTINUED | OUTPATIENT
Start: 2022-03-01 | End: 2022-03-01 | Stop reason: HOSPADM

## 2022-03-01 RX ORDER — ONDANSETRON 2 MG/ML
4 INJECTION INTRAMUSCULAR; INTRAVENOUS EVERY 8 HOURS PRN
Status: DISCONTINUED | OUTPATIENT
Start: 2022-03-01 | End: 2022-03-01 | Stop reason: HOSPADM

## 2022-03-01 RX ORDER — MORPHINE SULFATE 4 MG/ML
2 INJECTION, SOLUTION INTRAMUSCULAR; INTRAVENOUS EVERY 4 HOURS PRN
Status: DISCONTINUED | OUTPATIENT
Start: 2022-03-01 | End: 2022-03-01 | Stop reason: HOSPADM

## 2022-03-01 RX ORDER — CEFAZOLIN SODIUM 2 G/50ML
2000 SOLUTION INTRAVENOUS ONCE
Status: COMPLETED | OUTPATIENT
Start: 2022-03-01 | End: 2022-03-01

## 2022-03-01 RX ORDER — BUPIVACAINE HYDROCHLORIDE 5 MG/ML
INJECTION, SOLUTION PERINEURAL AS NEEDED
Status: DISCONTINUED | OUTPATIENT
Start: 2022-03-01 | End: 2022-03-01 | Stop reason: HOSPADM

## 2022-03-01 RX ORDER — HEPARIN SODIUM 5000 [USP'U]/ML
5000 INJECTION, SOLUTION INTRAVENOUS; SUBCUTANEOUS ONCE
Status: COMPLETED | OUTPATIENT
Start: 2022-03-01 | End: 2022-03-01

## 2022-03-01 RX ORDER — ONDANSETRON 2 MG/ML
4 INJECTION INTRAMUSCULAR; INTRAVENOUS ONCE AS NEEDED
Status: DISCONTINUED | OUTPATIENT
Start: 2022-03-01 | End: 2022-03-01 | Stop reason: HOSPADM

## 2022-03-01 RX ORDER — EPHEDRINE SULFATE 50 MG/ML
INJECTION INTRAVENOUS AS NEEDED
Status: DISCONTINUED | OUTPATIENT
Start: 2022-03-01 | End: 2022-03-01

## 2022-03-01 RX ORDER — FENTANYL CITRATE/PF 50 MCG/ML
50 SYRINGE (ML) INJECTION
Status: DISCONTINUED | OUTPATIENT
Start: 2022-03-01 | End: 2022-03-01 | Stop reason: HOSPADM

## 2022-03-01 RX ORDER — FENTANYL CITRATE 50 UG/ML
INJECTION, SOLUTION INTRAMUSCULAR; INTRAVENOUS AS NEEDED
Status: DISCONTINUED | OUTPATIENT
Start: 2022-03-01 | End: 2022-03-01

## 2022-03-01 RX ORDER — LIDOCAINE HYDROCHLORIDE 10 MG/ML
INJECTION, SOLUTION EPIDURAL; INFILTRATION; INTRACAUDAL; PERINEURAL AS NEEDED
Status: DISCONTINUED | OUTPATIENT
Start: 2022-03-01 | End: 2022-03-01

## 2022-03-01 RX ORDER — ONDANSETRON 2 MG/ML
INJECTION INTRAMUSCULAR; INTRAVENOUS AS NEEDED
Status: DISCONTINUED | OUTPATIENT
Start: 2022-03-01 | End: 2022-03-01

## 2022-03-01 RX ORDER — KETOROLAC TROMETHAMINE 30 MG/ML
INJECTION, SOLUTION INTRAMUSCULAR; INTRAVENOUS AS NEEDED
Status: DISCONTINUED | OUTPATIENT
Start: 2022-03-01 | End: 2022-03-01

## 2022-03-01 RX ORDER — HYDROMORPHONE HCL/PF 1 MG/ML
0.5 SYRINGE (ML) INJECTION
Status: DISCONTINUED | OUTPATIENT
Start: 2022-03-01 | End: 2022-03-01 | Stop reason: HOSPADM

## 2022-03-01 RX ORDER — OXYCODONE HYDROCHLORIDE AND ACETAMINOPHEN 5; 325 MG/1; MG/1
1 TABLET ORAL EVERY 4 HOURS PRN
Qty: 20 TABLET | Refills: 0 | Status: SHIPPED | OUTPATIENT
Start: 2022-03-01

## 2022-03-01 RX ADMIN — LIDOCAINE HYDROCHLORIDE 50 MG: 10 INJECTION, SOLUTION EPIDURAL; INFILTRATION; INTRACAUDAL; PERINEURAL at 10:14

## 2022-03-01 RX ADMIN — FENTANYL CITRATE 50 MCG: 50 INJECTION, SOLUTION INTRAMUSCULAR; INTRAVENOUS at 11:13

## 2022-03-01 RX ADMIN — FENTANYL CITRATE 50 MCG: 50 INJECTION, SOLUTION INTRAMUSCULAR; INTRAVENOUS at 10:14

## 2022-03-01 RX ADMIN — KETOROLAC TROMETHAMINE 30 MG: 30 INJECTION, SOLUTION INTRAMUSCULAR; INTRAVENOUS at 11:35

## 2022-03-01 RX ADMIN — DEXAMETHASONE SODIUM PHOSPHATE 4 MG: 4 INJECTION, SOLUTION INTRAMUSCULAR; INTRAVENOUS at 10:44

## 2022-03-01 RX ADMIN — FENTANYL CITRATE 50 MCG: 50 INJECTION, SOLUTION INTRAMUSCULAR; INTRAVENOUS at 11:21

## 2022-03-01 RX ADMIN — ONDANSETRON HYDROCHLORIDE 4 MG: 2 INJECTION, SOLUTION INTRAVENOUS at 11:35

## 2022-03-01 RX ADMIN — HEPARIN SODIUM 5000 UNITS: 5000 INJECTION, SOLUTION INTRAVENOUS; SUBCUTANEOUS at 08:16

## 2022-03-01 RX ADMIN — EPHEDRINE SULFATE 10 MG: 50 INJECTION, SOLUTION INTRAVENOUS at 10:49

## 2022-03-01 RX ADMIN — CEFAZOLIN SODIUM 2000 MG: 2 SOLUTION INTRAVENOUS at 10:11

## 2022-03-01 RX ADMIN — SODIUM CHLORIDE, SODIUM LACTATE, POTASSIUM CHLORIDE, AND CALCIUM CHLORIDE: .6; .31; .03; .02 INJECTION, SOLUTION INTRAVENOUS at 10:13

## 2022-03-01 RX ADMIN — GLYCOPYRROLATE 0.2 MG: 0.2 INJECTION, SOLUTION INTRAMUSCULAR; INTRAVENOUS at 10:52

## 2022-03-01 RX ADMIN — SODIUM CHLORIDE, SODIUM LACTATE, POTASSIUM CHLORIDE, AND CALCIUM CHLORIDE 125 ML/HR: .6; .31; .03; .02 INJECTION, SOLUTION INTRAVENOUS at 08:16

## 2022-03-01 RX ADMIN — FENTANYL CITRATE 50 MCG: 50 INJECTION, SOLUTION INTRAMUSCULAR; INTRAVENOUS at 12:19

## 2022-03-01 RX ADMIN — FENTANYL CITRATE 50 MCG: 50 INJECTION, SOLUTION INTRAMUSCULAR; INTRAVENOUS at 10:41

## 2022-03-01 RX ADMIN — PROPOFOL 200 MG: 10 INJECTION, EMULSION INTRAVENOUS at 10:14

## 2022-03-01 NOTE — ANESTHESIA POSTPROCEDURE EVALUATION
Post-Op Assessment Note    CV Status:  Stable    Pain management: satisfactory to patient     Mental Status:  Alert, awake and sleepy   Hydration Status:  Euvolemic   PONV Controlled:  Controlled   Airway Patency:  Patent      Post Op Vitals Reviewed: Yes      Staff: Anesthesiologist, CRNA         No complications documented      /71 (03/01/22 1210)    Temp   98F   Pulse 79 (03/01/22 1210)   Resp (!) 23 (03/01/22 1210)    SpO2 98 % (03/01/22 1210)

## 2022-03-01 NOTE — OP NOTE
OPERATIVE REPORT  PATIENT NAME: Sandi Quintana    :  1975  MRN: [id number]  Pt Location: [facility] OR ROOM 01    SURGERY DATE: 3/1/2022    Surgeon(s) and Role: * Dulce Garcia DO - Primary     * Dodie Proctor PA-C - Assisting    Preop Diagnosis:  Left inguinal hernia [K40 90]    Post-Op Diagnosis Codes:     * Left inguinal hernia [K40 90]    Procedure(s) (LRB):  REPAIR HERNIA INGUINAL WITH MESH (Left)    Specimen(s):  * No specimens in log *    Estimated Blood Loss:   Minimal    Drains:  * No LDAs found *    Anesthesia Type:   General/LMA    Operative Indications:  Left inguinal hernia [K40 90]      Operative Findings:  Large Incarcerated Left inguinal hernia with indirect component    Complications:   None    Procedure and Technique:  The patient was taken to the operating arena and placed in supine position on operating table  All irregular monitoring devices were connected  Gen  Anesthesia was induced and the patient underwent uncomplicated LMA intubation  Perioperative antibiotics were administered  Subcutaneous heparin along with bilateral lower extremity sequential compression devices were placed for DVT prophylaxis  The patient was then prepped and draped in usual sterile fashion  A timeout was performed to verify correct patient, site, procedure, and positioning  Landmarks were then identified including the left ASIS and pubic tubercle  An incision was marked in a natural skin crease approximately 3 fingerbreadths above theleft groin crease, and planned to end near the pubic tubercle  The skin crease incision was made with a 15 blade scalpel and deepened through Lia's and Camper's fascia with electrocautery until the aponeurosis of the external oblique was encountered  This was cleaned and the external ring was exposed  Hemostasis was achieved in the wound  At this point it was noted the hernia to be bulging through the internal ring   Incision was made in the midportion of the external oblique aponeurosis in the direction of its fibersl  The ilioinguinal nerve was identified and protected  Flaps of the external oblique were developed cephalad and inferiorly  The hernia and cord structures were carefully freed up from the shelving edge and the conjoined tendon  The cord structures including the vas deferens and surrounding vasculature was then identified and Penrose drain was then placed around them  The hernia sac was then carefully dissected free of the cord structures using both blunt and sharp technique  Once the hernia sac was completely dissected it was then reduced into the intraabdominal cavity  Hemostasis was achieved at this point  Attention was then focused on repair the hernia  The floor of the inguinal canal was palpated and appeared to be weak  A largemesh plug was then inserted into the hernia defect just inferior to the cord  The mesh was then secured to the conjoined tendon in addition to the shelving edge using 2-0 Vicryl sutures  Once this repair was completed the floor of the canal was then reinforced using a mesh onlay  The mesh was secured at the pubic tubercle, in addition to multiple spots along the conjoined tendon and the shelving edge using interrupted 2-0 Vicryl sutures  The mesh was then cut in a fashion to allow for re-creation of the internal ring  The tails of the mesh were then secured using 2-0 Vicryl and care was taken that there was adequate room for the cord structures so that there was no impingement on the cord, and that the surgeon's fifth fingertip could fit between the cord and the mesh  Inguinal canal was then irrigated with warm normal saline  Hemostasis was secured  The external oblique fascia was then approximated using running 3-0 Vicryl suture  The wound was then again irrigated with warm normal saline  Lia's fascia was then approximated with interrupted 3-0 Vicryl sutures  Skin was then closed with 4-0 Monocryl in a running fashion       The testes were gently pulled down to anatomic position and scrotum  The patient tolerated procedure well was taken to the postanesthesia care unit in stable condition  All sponge, needle, and history counts were correct       I was present for the entire procedure, A qualified resident physician was not available and A physician assistant was required during the procedure for retraction tissue handling,dissection and suturing    Patient Disposition:  PACU       SIGNATURE: Mark Hale DO  DATE: March 1, 2022  TIME: 12:06 PM

## 2022-03-01 NOTE — DISCHARGE INSTRUCTIONS
Follow up with Dr Bethany To in two weeks as per appointment  Regular diet as tolerated  Low intensity activity as tolerated  No heavy lifting, nothing greater than 10lbs for 6 weeks  Dressing may be removed on Thursday  You may shower on Thursday  No baths or swimming      If develop fever, nausea or vomiting, redness or drainage from the incision then call the office or go to the ER

## 2022-03-01 NOTE — INTERVAL H&P NOTE
H&P reviewed  After examining the patient I find no changes in the patients condition since the H&P had been written      Vitals:    03/01/22 0752   BP: 127/84   Pulse: 85   Resp: 16   Temp: 97 5 °F (36 4 °C)   SpO2: 99%

## 2022-03-01 NOTE — ANESTHESIA PREPROCEDURE EVALUATION
Procedure:  REPAIR HERNIA INGUINAL WITH MESH (Left Groin)    Relevant Problems   ANESTHESIA (within normal limits)      CARDIO   (+) Mixed hyperlipidemia      MUSCULOSKELETAL   (+) Acute drug-induced gout of right shoulder      NEURO/PSYCH   (+) Chronic right shoulder pain   (+) History of gout   (+) Numbness and tingling in both hands        Physical Exam    Airway    Mallampati score: II  TM Distance: >3 FB  Neck ROM: full     Dental       Cardiovascular  Rate: normal,     Pulmonary  Pulmonary exam normal     Other Findings  Per pt denies anything remaining that is loose or removeable      Anesthesia Plan  ASA Score- 2     Anesthesia Type- general with ASA Monitors  Additional Monitors:   Airway Plan: LMA  Plan Factors-Exercise tolerance (METS): >4 METS  Chart reviewed  Patient summary reviewed  Patient is a current smoker  Induction- intravenous  Postoperative Plan- Plan for postoperative opioid use  Informed Consent- Anesthetic plan and risks discussed with patient  I personally reviewed this patient with the CRNA  Discussed and agreed on the Anesthesia Plan with the CRNA  Marizol Mcneil

## 2022-03-15 ENCOUNTER — OFFICE VISIT (OUTPATIENT)
Dept: SURGERY | Facility: CLINIC | Age: 47
End: 2022-03-15

## 2022-03-15 VITALS
SYSTOLIC BLOOD PRESSURE: 111 MMHG | BODY MASS INDEX: 29.55 KG/M2 | TEMPERATURE: 98.4 F | HEART RATE: 74 BPM | WEIGHT: 223 LBS | HEIGHT: 73 IN | DIASTOLIC BLOOD PRESSURE: 71 MMHG

## 2022-03-15 DIAGNOSIS — K40.90 LEFT INGUINAL HERNIA: Primary | ICD-10-CM

## 2022-03-15 PROCEDURE — 99024 POSTOP FOLLOW-UP VISIT: CPT | Performed by: SURGERY

## 2022-03-15 NOTE — ASSESSMENT & PLAN NOTE
Status post left inguinal hernia pair with mesh  Doing very well  Incision healed very well  Continue no heavy lifting for 4 weeks  Follow-up 1 month

## 2022-03-15 NOTE — PROGRESS NOTES
Assessment/Plan:    Left inguinal hernia  Status post left inguinal hernia pair with mesh  Doing very well  Incision healed very well  Continue no heavy lifting for 4 weeks  Follow-up 1 month  Diagnoses and all orders for this visit:    Left inguinal hernia          Subjective:      Patient ID: Madonna Leos is a 55 y o  male  59-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man status post left inguinal hernia repair with mesh presents today for postop check  Overall doing very well  No nausea vomiting fevers chills  Tolerating diet  No changes in bowel bladder habits  Minimal tenderness  Patient took no narcotics postoperatively  Overall doing very well  The following portions of the patient's history were reviewed and updated as appropriate:   He  has no past medical history on file  He   Patient Active Problem List    Diagnosis Date Noted    BMI 30 0-30 9,adult 09/15/2021    Varicose veins of both lower extremities with inflammation 09/15/2021    Cellulitis of left lower extremity 09/15/2021    Calcific tendinitis of right shoulder 08/07/2020    Chronic right shoulder pain 07/15/2020    Acute drug-induced gout of right shoulder 07/07/2020    Mixed hyperlipidemia 02/04/2019    Numbness and tingling in both hands 02/04/2019    Vitamin D deficiency 02/04/2019    History of gout 02/04/2019    Left inguinal hernia 02/04/2019     He  has a past surgical history that includes No past surgeries and pr repair ing hernia,5+y/o,reducibl (Left, 3/1/2022)  His family history includes Gout in his brother and father; No Known Problems in his mother  He  reports that he has never smoked  He has never used smokeless tobacco  He reports current alcohol use  He reports current drug use  Drug: Marijuana    Current Outpatient Medications   Medication Sig Dispense Refill    oxyCODONE-acetaminophen (PERCOCET) 5-325 mg per tablet Take 1 tablet by mouth every 4 (four) hours as needed for moderate pain or severe pain Max Daily Amount: 6 tablets 20 tablet 0     No current facility-administered medications for this visit  He has No Known Allergies       Review of Systems   Constitutional: Negative for activity change, appetite change, chills, diaphoresis, fatigue, fever and unexpected weight change  Gastrointestinal: Negative for abdominal pain  Skin: Negative for color change, pallor and rash  Objective:      /71   Pulse 74   Temp 98 4 °F (36 9 °C)   Ht 6' 1" (1 854 m)   Wt 101 kg (223 lb)   BMI 29 42 kg/m²          Physical Exam  Vitals reviewed  Constitutional:       General: He is not in acute distress  Appearance: Normal appearance  He is not ill-appearing, toxic-appearing or diaphoretic  Abdominal:      General: Abdomen is flat  There is no distension  Palpations: Abdomen is soft  There is no mass  Tenderness: There is no abdominal tenderness  Hernia: No hernia is present  Comments: Left inguinal incision clean dry intact   Neurological:      Mental Status: He is alert

## 2022-04-11 ENCOUNTER — OFFICE VISIT (OUTPATIENT)
Dept: SURGERY | Facility: CLINIC | Age: 47
End: 2022-04-11

## 2022-04-11 VITALS
WEIGHT: 232.2 LBS | DIASTOLIC BLOOD PRESSURE: 67 MMHG | TEMPERATURE: 97.3 F | BODY MASS INDEX: 30.77 KG/M2 | SYSTOLIC BLOOD PRESSURE: 112 MMHG | HEIGHT: 73 IN | HEART RATE: 70 BPM

## 2022-04-11 DIAGNOSIS — K40.90 LEFT INGUINAL HERNIA: Primary | ICD-10-CM

## 2022-04-11 PROCEDURE — 99024 POSTOP FOLLOW-UP VISIT: CPT | Performed by: SURGERY

## 2022-04-11 NOTE — PROGRESS NOTES
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  Feels as though he can return to normal daily activities  Incision has completely healed  No other complaints  The following portions of the patient's history were reviewed and updated as appropriate: allergies, current medications, past family history, past medical history, past social history, past surgical history and problem list     Review of Systems   Constitutional: Negative for chills and fever  HENT: Negative for ear pain and sore throat  Eyes: Negative for pain and visual disturbance  Respiratory: Negative for cough and shortness of breath  Cardiovascular: Negative for chest pain and palpitations  Gastrointestinal: Negative for abdominal pain and vomiting  Mild left dull groin pain aw healing   Genitourinary: Negative for dysuria and hematuria  Musculoskeletal: Negative for arthralgias and back pain  Skin: Negative for color change and rash  Neurological: Negative for seizures and syncope  All other systems reviewed and are negative          Objective:      /67   Pulse 70   Temp (!) 97 3 °F (36 3 °C) (Tympanic)   Ht 6' 1" (1 854 m)   Wt 105 kg (232 lb 3 2 oz)   BMI 30 64 kg/m²

## 2022-04-11 NOTE — ASSESSMENT & PLAN NOTE
6 week postop left inguinal hernia with mesh  Patient feeling very well  Incision completely healed  No weight restrictions  Return to normal activity

## 2022-05-17 NOTE — PATIENT INSTRUCTIONS
You had a cortisone (steroid) injection  There are two medicines in this injection, a numbing medicine and a steroid  The numbing medication component usually takes effect within a few minutes and wears off after a few hours, after which your pain may return  The steroid medication typically takes effect in 3-5 days, although some people have benefits sooner, and lasts for a much longer time  You should take naproxen (two tablets- 440 mg) twice a day x 5 days and then only if needed afterwards  You will be starting physical therapy  It is important to do a home exercise program as you go through PT to speed up your recovery  Rehab addresses the problems that are causing your pain, instead of covering them up, as some other treatment options do  If your pain worsens/does not improve with rehab, we can see you back earlier than planned  Please call our office if that is the case  Otherwise, we will see you on your follow up as scheduled  Azelaic Acid Counseling: Patient counseled that medicine may cause skin irritation and to avoid applying near the eyes.  In the event of skin irritation, the patient was advised to reduce the amount of the drug applied or use it less frequently.   The patient verbalized understanding of the proper use and possible adverse effects of azelaic acid.  All of the patient's questions and concerns were addressed.

## 2023-04-18 PROBLEM — N52.9 ERECTILE DYSFUNCTION: Status: ACTIVE | Noted: 2023-04-18

## 2023-04-25 ENCOUNTER — OFFICE VISIT (OUTPATIENT)
Dept: UROLOGY | Facility: CLINIC | Age: 48
End: 2023-04-25

## 2023-04-25 VITALS
BODY MASS INDEX: 29.16 KG/M2 | SYSTOLIC BLOOD PRESSURE: 140 MMHG | WEIGHT: 220 LBS | HEIGHT: 73 IN | HEART RATE: 73 BPM | DIASTOLIC BLOOD PRESSURE: 80 MMHG

## 2023-04-25 DIAGNOSIS — N52.9 ERECTILE DYSFUNCTION, UNSPECIFIED ERECTILE DYSFUNCTION TYPE: Primary | ICD-10-CM

## 2023-04-25 DIAGNOSIS — R68.82 DECREASED LIBIDO: ICD-10-CM

## 2023-04-25 DIAGNOSIS — R79.89 LOW TESTOSTERONE: ICD-10-CM

## 2023-04-25 NOTE — PROGRESS NOTES
4/25/2023    No chief complaint on file  Assessment and Plan    52 y o  male new patient to office    1  Low testosterone  · Total testosterone 325 as of 4/12/2023  · Symptoms of decreased libido, erectile dysfunction, as well as penile and testicular atrophy  · Physical exam of penis, scrotum, and testicles is unremarkable with normal size and symmetry of testicles and phallus  · We discussed testosterone replacement therapy and reviewed both potential risk versus benefits  As his total testosterone is low normal I feel it is appropriate to recheck a total testosterone level as well as FSH, LH, prolactin, TSH,hemoglobin A1c as well as a PSA  He will then follow-up in 2 to 3 weeks to review these results and we will again discuss consideration to start testosterone placement therapy  History of Present Illness  Zina Mac is a 52 y o  male new patient to office with PMHx significant for gout, hyperlipidemia, vitamin D deficiency  Here for evaluation of erectile dysfunction, decreased libido  He reports prior Urologic care in 2007 for history of varicocele and hydrocele  He presents today to discuss issues with low sex drive and erectile dysfunction with recent outpatient blood work indicating low testosterone (this will be uploaded under media tab)  He reports undergoing left inguinal hernia repair about 1 year ago  He reports his troubles with decreased sex drive and erectile dysfunction started since then  He also feels as though his penis and testicles have shrunken in size since then as well  He continue to experiencing intermittent left groin pain following surgery and has decreased sensation to the left groin  He denies any history of testosterone replacement therapy in the past   He does report a history of marijuana use however quit approximately 1 year ago and only smokes on occasion  Denies any significant alcohol use as well    No family history of prostate cancer and denies any "lower urinary tract symptoms  Review of Systems   Constitutional: Negative for chills and fever  HENT: Negative for congestion and sore throat  Respiratory: Negative for cough and shortness of breath  Cardiovascular: Negative for chest pain and leg swelling  Gastrointestinal: Negative for abdominal pain, constipation, diarrhea, nausea and vomiting  Genitourinary: Negative for difficulty urinating, dysuria, flank pain, frequency, hematuria and urgency  Decrease sex drive and erectile dysfunction   Musculoskeletal: Negative for back pain and gait problem  Skin: Negative for wound  Allergic/Immunologic: Negative for immunocompromised state  Neurological: Negative for dizziness, weakness and numbness  Hematological: Does not bruise/bleed easily  Vitals  Vitals:    04/25/23 1348   BP: 140/80   Pulse: 73   Weight: 99 8 kg (220 lb)   Height: 6' 1\" (1 854 m)       Physical Exam  Vitals reviewed  Constitutional:       General: He is not in acute distress  Appearance: Normal appearance  He is not ill-appearing or toxic-appearing  HENT:      Head: Normocephalic and atraumatic  Eyes:      General: No scleral icterus  Conjunctiva/sclera: Conjunctivae normal    Cardiovascular:      Rate and Rhythm: Normal rate and regular rhythm  Pulses: Normal pulses  Heart sounds: Normal heart sounds  Pulmonary:      Effort: Pulmonary effort is normal  No respiratory distress  Abdominal:      Palpations: Abdomen is soft  Tenderness: There is no abdominal tenderness  There is no right CVA tenderness or left CVA tenderness  Hernia: No hernia is present  Genitourinary:     Comments: Normal circumcised phallus, testicles are descended bilaterally with normal limits  Musculoskeletal:      Cervical back: Normal range of motion  Right lower leg: No edema  Left lower leg: No edema  Skin:     General: Skin is warm and dry        Coloration: Skin is " not jaundiced or pale  Neurological:      General: No focal deficit present  Mental Status: He is alert and oriented to person, place, and time  Mental status is at baseline  Gait: Gait normal    Psychiatric:         Mood and Affect: Mood normal          Behavior: Behavior normal          Thought Content: Thought content normal          Judgment: Judgment normal          Past History  History reviewed  No pertinent past medical history  Social History     Socioeconomic History   • Marital status: /Civil Union     Spouse name: None   • Number of children: None   • Years of education: None   • Highest education level: None   Occupational History   • None   Tobacco Use   • Smoking status: Never   • Smokeless tobacco: Never   Vaping Use   • Vaping Use: Never used   Substance and Sexual Activity   • Alcohol use: Yes     Comment: 5x week- beer   • Drug use: Yes     Types: Marijuana     Comment: occassional, used last week    • Sexual activity: None   Other Topics Concern   • None   Social History Narrative   • None     Social Determinants of Health     Financial Resource Strain: Not on file   Food Insecurity: Not on file   Transportation Needs: Not on file   Physical Activity: Not on file   Stress: Not on file   Social Connections: Not on file   Intimate Partner Violence: Not on file   Housing Stability: Not on file     Social History     Tobacco Use   Smoking Status Never   Smokeless Tobacco Never     Family History   Problem Relation Age of Onset   • Gout Father    • No Known Problems Mother    • Gout Brother        The following portions of the patient's history were reviewed and updated as appropriate allergies, current medications, past medical history, past social history, past surgical history and problem list    Imaging:    Results  No results found for this or any previous visit (from the past 1 hour(s))  ]  No results found for: PSA  Lab Results   Component Value Date    CALCIUM 9 3 07/13/2020 K 3 9 07/13/2020    CO2 27 07/13/2020     07/13/2020    BUN 21 07/13/2020    CREATININE 0 83 07/13/2020     Lab Results   Component Value Date    WBC 7 36 07/13/2020    HGB 14 3 07/13/2020    HCT 42 7 07/13/2020    MCV 90 07/13/2020     07/13/2020       Please Note:  Voice dictation software has been used to create this document  There may be inadvertent transcriptions errors       DADA Vick 04/25/23

## 2023-04-26 ENCOUNTER — OFFICE VISIT (OUTPATIENT)
Dept: SURGERY | Facility: CLINIC | Age: 48
End: 2023-04-26

## 2023-04-26 VITALS
WEIGHT: 222 LBS | OXYGEN SATURATION: 97 % | DIASTOLIC BLOOD PRESSURE: 70 MMHG | HEART RATE: 54 BPM | TEMPERATURE: 96 F | SYSTOLIC BLOOD PRESSURE: 142 MMHG | BODY MASS INDEX: 29.42 KG/M2 | HEIGHT: 73 IN

## 2023-04-26 DIAGNOSIS — Z51.89 ENCOUNTER FOR WOUND CARE: ICD-10-CM

## 2023-04-26 DIAGNOSIS — R10.32 LEFT INGUINAL PAIN: Primary | ICD-10-CM

## 2023-04-26 NOTE — PROGRESS NOTES
"Assessment/Plan:    Left inguinal pain  -presents for evaluation of left inguinal pain in the region of previous scar/inguinal repair on 3/1/22, reports dull pain that comes and goes, no reliable frequency, no specific provocation factors, says this pain has been present basically since surgery  -also reports erectile dysfunction during this time as well as decreased sexual drive, in addition his testes and penis occasionally feel \"numb,\" he has seen urology recently on 4/25 who are working him up for possible decreased testosterone  -on further questioning it seems the testicular/penile symptoms and left inguinal pain were present prior to surgery, different from the inguinal hernia related pain prior to repair, he states the hernia related pain was gone after surgical repair  -patient would rather not try any neuropathic pain medication or any additional imaging until his urological workup is complete, which is reasonable, and will call the office for further appointments as needed and if he wishes to undergo any further imaging to evaluate for recurrence, or trial neuropathic medication such as gabapentin  -no left inguinal hernia appreciated on exam, well healed surgical scar       Diagnoses and all orders for this visit:    Left inguinal pain    Encounter for wound care          Subjective:      Patient ID: Travis Andrews is a 52 y o  male w history of left inguinal hernia repair in march 2022, presenting for continued dull pain in the left inguinal region around the scar/surgical area    HPI     Symptoms as above, patient states the dull pain in his left inguinal region is more annoying and bothersome than truly painful, says he could live with it if he had to  Seems to come about randomly without warning, no specific inciting factors, no traumatic events  Denies any alarm symptoms, he has no recent changes in diet, no n/v, no changes in bowel movements which he has daily/regularly, no fevers/chills      The " following portions of the patient's history were reviewed and updated as appropriate:   He  has no past medical history on file  He   Patient Active Problem List    Diagnosis Date Noted   • Left inguinal pain 04/26/2023   • Erectile dysfunction 04/18/2023   • BMI 29 0-29 9,adult 04/18/2023   • BMI 30 0-30 9,adult 09/15/2021   • Varicose veins of both lower extremities with inflammation 09/15/2021   • Cellulitis of left lower extremity 09/15/2021   • Calcific tendinitis of right shoulder 08/07/2020   • Chronic right shoulder pain 07/15/2020   • Acute drug-induced gout of right shoulder 07/07/2020   • Mixed hyperlipidemia 02/04/2019   • Numbness and tingling in both hands 02/04/2019   • Vitamin D deficiency 02/04/2019   • History of gout 02/04/2019   • Left inguinal hernia 02/04/2019     He  has a past surgical history that includes No past surgeries; pr rpr 1st ingun hrna age 11 yrs/> reducible (Left, 03/01/2022); and Hernia repair  His family history includes Gout in his brother and father; No Known Problems in his mother  He  reports that he has never smoked  He has never used smokeless tobacco  He reports current alcohol use  He reports current drug use  Drug: Marijuana  Current Outpatient Medications   Medication Sig Dispense Refill   • oxyCODONE-acetaminophen (PERCOCET) 5-325 mg per tablet Take 1 tablet by mouth every 4 (four) hours as needed for moderate pain or severe pain Max Daily Amount: 6 tablets (Patient not taking: Reported on 4/11/2022) 20 tablet 0     No current facility-administered medications for this visit  He has No Known Allergies       Review of Systems   Constitutional: Negative for activity change, appetite change, chills and fever  Respiratory: Negative for shortness of breath  Cardiovascular: Negative for chest pain  Gastrointestinal: Negative for abdominal distention, abdominal pain, constipation, diarrhea, nausea and vomiting     Genitourinary: Negative for difficulty "urinating  Musculoskeletal: Negative for back pain and neck pain  Neurological: Negative for dizziness, light-headedness and headaches  Psychiatric/Behavioral: Negative for agitation and confusion  The patient is not nervous/anxious  All other systems reviewed and are negative  Objective:      /70 (BP Location: Left arm, Patient Position: Sitting, Cuff Size: Standard)   Pulse (!) 54   Temp (!) 96 °F (35 6 °C) (Tympanic)   Ht 6' 1\" (1 854 m)   Wt 101 kg (222 lb)   SpO2 97%   BMI 29 29 kg/m²          Physical Exam  Vitals reviewed  Constitutional:       General: He is not in acute distress  Appearance: Normal appearance  He is not ill-appearing or toxic-appearing  HENT:      Head: Normocephalic and atraumatic  Nose: Nose normal       Mouth/Throat:      Mouth: Mucous membranes are moist    Eyes:      Extraocular Movements: Extraocular movements intact  Cardiovascular:      Rate and Rhythm: Normal rate  Pulses: Normal pulses  Pulmonary:      Effort: Pulmonary effort is normal  No respiratory distress  Abdominal:      General: There is no distension  Palpations: Abdomen is soft  Tenderness: There is no abdominal tenderness  Hernia: There is no hernia in the left inguinal area  Genitourinary:     Testes:         Left: Tenderness or swelling not present  Musculoskeletal:         General: Normal range of motion  Cervical back: Normal range of motion  Skin:     General: Skin is warm  Capillary Refill: Capillary refill takes less than 2 seconds  Coloration: Skin is not jaundiced or pale  Findings: No erythema  Neurological:      Mental Status: He is alert and oriented to person, place, and time  Cranial Nerves: No cranial nerve deficit     Psychiatric:         Mood and Affect: Mood normal          "

## 2023-04-27 NOTE — ASSESSMENT & PLAN NOTE
"-presents for evaluation of left inguinal pain in the region of previous scar/inguinal repair on 3/1/22, reports dull pain that comes and goes, no reliable frequency, no specific provocation factors, says this pain has been present basically since surgery  -also reports erectile dysfunction during this time as well as decreased sexual drive, in addition his testes and penis occasionally feel \"numb,\" he has seen urology recently on 4/25 who are working him up for possible decreased testosterone  -on further questioning it seems the testicular/penile symptoms and left inguinal pain were present prior to surgery, different from the inguinal hernia related pain prior to repair, he states the hernia related pain was gone after surgical repair  -patient would rather not try any neuropathic pain medication or any additional imaging until his urological workup is complete, which is reasonable, and will call the office for further appointments as needed and if he wishes to undergo any further imaging to evaluate for recurrence, or trial neuropathic medication such as gabapentin  -no left inguinal hernia appreciated on exam, well healed surgical scar  "

## 2023-05-05 ENCOUNTER — TELEPHONE (OUTPATIENT)
Dept: UROLOGY | Facility: CLINIC | Age: 48
End: 2023-05-05

## 2023-05-05 NOTE — TELEPHONE ENCOUNTER
Telephone call to pt  Reminded him to have labs drawn  Pt stated he did have them drawn at [facility]      Telephone call to [facility]  They stated that pt had them done 5/1  They will fax them over to us

## 2023-05-09 ENCOUNTER — OFFICE VISIT (OUTPATIENT)
Dept: UROLOGY | Facility: CLINIC | Age: 48
End: 2023-05-09

## 2023-05-09 VITALS
HEIGHT: 73 IN | SYSTOLIC BLOOD PRESSURE: 130 MMHG | WEIGHT: 225 LBS | BODY MASS INDEX: 29.82 KG/M2 | DIASTOLIC BLOOD PRESSURE: 82 MMHG

## 2023-05-09 DIAGNOSIS — I86.1 VARICOCELE: ICD-10-CM

## 2023-05-09 DIAGNOSIS — R10.32 LEFT INGUINAL PAIN: Primary | ICD-10-CM

## 2023-05-09 NOTE — PROGRESS NOTES
5/9/2023    No chief complaint on file  Assessment and Plan    52 y o  male manage by    1  Low testosterone  · Total testosterone  · 325 (4/12/2023)  · 355 (5/01/2023)  · CBC, CMP, A1C, FSH, LH, and Prolactin all within expected ranges  · Symptoms of decreased libido and erectile dysfunction  · We again discuss consideration to trial testosterone replacement therapy as his levels are considered low normal  He is unsure if he would like to start TRT at this time and feels that his low testosterone may be from his history of a varicocele and would like recheck this with an update US of scrotum and testicles  2  Groin pain  · Chronic intermittent left groin pain status post left inguinal hernia repair about 1 year ago  Also has history of varicocele and hydrocele approximately 15 years ago  · He recently saw Dr Franc Crisostomo with general surgery who recommended consideration for repeat US imaging of the scrotum and testicles  · I do agree that this would be beneficial to rule out any new or worsening varicocele  Of note, physical exam of scrotum, testicles, and phallus during last office visit was unremarkable  · We will check a renal ultrasound and he will follow-up with Dr Gunnar Galvin to review this as well as to discuss possible consideration for TRT  Subjective:      He presents today to review his repeat labs for reassessment of low testosterone  These can be reviewed under the media tab from 5/5/2023  Repeat total testosterone was 355  Additional labs were all within normal limits  He does report that he recently was on a short vacation and did not have much issues with erectile dysfunction  He thinks that it is possible he may have been less stressed  He continues to complain of intermittent left groin pain and was recently seen by Dr Franc Crisostomo with general surgery for follow-up evaluation following inguinal hernia repair in March 2022    As patient's symptoms seem to be present prior to inguinal hernia repair Dr Amna Crawley recommended consideration for repeat scrotal ultrasound  Patient last had a scrotal ultrasound in 2007 and was told that he has bilateral hydrocele and a varicocele  History of Present Illness  Baljeet Del Rosario is a 52 y o  male here for follow-up evaluation of erectile dysfunction and decreased libido  He was initially seen by me on 4/25/2023 and reported prior urologic care in 2007 for history of varicocele and hydrocele  He was seen to discuss issues with low sex drive and erectile dysfunction with recent outpatient blood work indicating low testosterone  He reported that he had been having issues with decreased sex drive as well as erectile dysfunction following left inguinal hernia repair about 1 year ago  Also felt as though his penis and testicles have shrunken in size since then as well  He continue to experiencing intermittent left groin pain following surgery and has decreased sensation to the left groin  He denies any history of testosterone replacement therapy in the past   He does report a history of marijuana use however quit approximately 1 year ago and only smokes on occasion  Denies any significant alcohol use as well  No family history of prostate cancer and denies any lower urinary tract symptoms  Total testosterone was noted to be 325 as of 4/12/2023  As this was considered a low normal and given his complaints of erectile dysfunction and decreased libido I did feel it appropriate to recheck his total testosterone  I recommended a complete hypogonadism work-up including a total testosterone as well as FSH, LH, prolactin, TSH, hemoglobin A1c, and a PSA  Review of Systems   Constitutional: Negative for chills and fever  HENT: Negative for congestion and sore throat  Respiratory: Negative for cough and shortness of breath  Cardiovascular: Negative for chest pain and leg swelling     Gastrointestinal: Negative for abdominal "pain, constipation and diarrhea  Genitourinary: Negative for difficulty urinating, dysuria, frequency, hematuria and urgency  Dull groin/scrotal pain   Musculoskeletal: Negative for back pain and gait problem  Skin: Negative for wound  Allergic/Immunologic: Negative for immunocompromised state  Hematological: Does not bruise/bleed easily  Vitals  Vitals:    05/09/23 1348   BP: 130/82   BP Location: Left arm   Patient Position: Sitting   Cuff Size: Adult   Weight: 102 kg (225 lb)   Height: 6' 1\" (1 854 m)       Physical Exam  Vitals reviewed  Constitutional:       General: He is not in acute distress  Appearance: Normal appearance  He is not ill-appearing or toxic-appearing  HENT:      Head: Normocephalic and atraumatic  Eyes:      General: No scleral icterus  Conjunctiva/sclera: Conjunctivae normal    Cardiovascular:      Rate and Rhythm: Normal rate  Pulmonary:      Effort: Pulmonary effort is normal  No respiratory distress  Abdominal:      Tenderness: There is no right CVA tenderness or left CVA tenderness  Hernia: No hernia is present  Musculoskeletal:      Cervical back: Normal range of motion  Right lower leg: No edema  Left lower leg: No edema  Skin:     General: Skin is warm and dry  Coloration: Skin is not jaundiced or pale  Neurological:      General: No focal deficit present  Mental Status: He is alert and oriented to person, place, and time  Mental status is at baseline  Gait: Gait normal    Psychiatric:         Mood and Affect: Mood normal          Behavior: Behavior normal          Thought Content: Thought content normal          Judgment: Judgment normal          Past History  History reviewed  No pertinent past medical history    Social History     Socioeconomic History   • Marital status: /Civil Union     Spouse name: None   • Number of children: None   • Years of education: None   • Highest education level: " None   Occupational History   • None   Tobacco Use   • Smoking status: Never   • Smokeless tobacco: Never   Vaping Use   • Vaping Use: Never used   Substance and Sexual Activity   • Alcohol use: Yes     Comment: 5x week- beer   • Drug use: Yes     Types: Marijuana     Comment: occassional, used last week    • Sexual activity: None   Other Topics Concern   • None   Social History Narrative   • None     Social Determinants of Health     Financial Resource Strain: Not on file   Food Insecurity: Not on file   Transportation Needs: Not on file   Physical Activity: Not on file   Stress: Not on file   Social Connections: Not on file   Intimate Partner Violence: Not on file   Housing Stability: Not on file     Social History     Tobacco Use   Smoking Status Never   Smokeless Tobacco Never     Family History   Problem Relation Age of Onset   • Gout Father    • No Known Problems Mother    • Gout Brother        The following portions of the patient's history were reviewed and updated as appropriate allergies, current medications, past medical history, past social history, past surgical history and problem list    Imaging:    Results  No results found for this or any previous visit (from the past 1 hour(s))  ]  No results found for: PSA  Lab Results   Component Value Date    CALCIUM 9 3 07/13/2020    K 3 9 07/13/2020    CO2 27 07/13/2020     07/13/2020    BUN 21 07/13/2020    CREATININE 0 83 07/13/2020     Lab Results   Component Value Date    WBC 7 36 07/13/2020    HGB 14 3 07/13/2020    HCT 42 7 07/13/2020    MCV 90 07/13/2020     07/13/2020       Please Note:  Voice dictation software has been used to create this document  There may be inadvertent transcriptions errors       DADA Ball 05/09/23

## 2023-05-16 ENCOUNTER — HOSPITAL ENCOUNTER (OUTPATIENT)
Dept: ULTRASOUND IMAGING | Facility: HOSPITAL | Age: 48
Discharge: HOME | End: 2023-05-16

## 2023-05-16 DIAGNOSIS — R10.32 LEFT INGUINAL PAIN: ICD-10-CM

## 2023-05-16 DIAGNOSIS — I86.1 VARICOCELE: ICD-10-CM

## 2023-06-12 ENCOUNTER — OFFICE VISIT (OUTPATIENT)
Dept: UROLOGY | Facility: CLINIC | Age: 48
End: 2023-06-12
Payer: COMMERCIAL

## 2023-06-12 VITALS
WEIGHT: 216 LBS | BODY MASS INDEX: 28.63 KG/M2 | SYSTOLIC BLOOD PRESSURE: 122 MMHG | HEIGHT: 73 IN | DIASTOLIC BLOOD PRESSURE: 70 MMHG

## 2023-06-12 DIAGNOSIS — R68.82 DECREASED LIBIDO: Primary | ICD-10-CM

## 2023-06-12 DIAGNOSIS — I86.1 VARICOCELE: ICD-10-CM

## 2023-06-12 PROCEDURE — 99214 OFFICE O/P EST MOD 30 MIN: CPT | Performed by: UROLOGY

## 2023-06-12 NOTE — PROGRESS NOTES
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  Structurally is normal and his lab work basically is normal with his testosterone being low normal   I believe this is more psychological than physiologic but if he should become interested in wishes to try testosterone replacement we can do that  Discussed the possibility of counseling and that option  Otherwise recommend prostate cancer screening at age 48 and we will see him as needed  Chief Complaint     Chief Complaint   Patient presents with   • Follow-up     U/S results 23       History of Present Illness   51-year-old man, established patient but new to me-was seen by Mr Kvng Castillo May 9, 2023 for low testosterone, with 325 2023, 355 May 1, 2023  CBC, CMP A1c FSH and LH and prolactin all within normal limits  He has decreased libido and erectile dysfunction  The patient felt that his low testosterone may be from his history of a varicocele  He has chronic intermittent left groin pain status post left inguinal hernia pair about 1 year ago  Also has a history of varicocele and hydrocele about 15 years ago  After the hernia surgery, as when he developed the low libido and erectile dysfunction  He can still penetrate it is not like what it used to be  He feels that his libido and overall desire for sexual activity simply faded away after the surgery  No interest in sexual activity whatsoever    Ultrasound of the scrotum May 16, 2023 shows "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  There is a left-sided varicocele  Very small hydroceles bilaterally right greater than left  Nothing concerning  He has had a varicocele for most of his life  The following portions of the patient's history were reviewed and updated as appropriate: allergies, current medications, past family history, past medical history, past social history, past surgical history and problem list   History reviewed  No pertinent past medical history  Past Surgical History:   Procedure Laterality Date   • HERNIA REPAIR     • NO PAST SURGERIES     • LA RPR 1ST INGUN HRNA AGE 5 YRS/> REDUCIBLE Left 03/01/2022    Procedure: REPAIR HERNIA INGUINAL WITH MESH;  Surgeon: Teresa Vela DO;  Location: [facility];  Service: General     shoulder  Review of Systems   Review of Systems   Constitutional: Negative for unexpected weight change  Has felt chronic fatigue for years  Genitourinary: Negative  Neurological: Negative for dizziness and headaches  Active Problem List     Patient Active Problem List   Diagnosis   • Mixed hyperlipidemia   • Numbness and tingling in both hands   • Vitamin D deficiency   • History of gout   • Left inguinal hernia   • Acute drug-induced gout of right shoulder   • Chronic right shoulder pain   • Calcific tendinitis of right shoulder   • BMI 30 0-30 9,adult   • Varicose veins of both lower extremities with inflammation   • Cellulitis of left lower extremity   • Erectile dysfunction   • BMI 29 0-29 9,adult   • Left inguinal pain       Objective   /70 (BP Location: Left arm, Patient Position: Sitting, Cuff Size: Adult)   Ht 6' 1\" (1 854 m)   Wt 98 kg (216 lb)   BMI 28 50 kg/m²     Physical Exam  Vitals reviewed  Constitutional:       Appearance: Normal appearance  He is normal weight  HENT:      Head: Normocephalic and atraumatic  Eyes:      Extraocular Movements: Extraocular movements intact     Pulmonary:      Effort: Pulmonary " effort is normal    Abdominal:      General: There is no distension  Palpations: Abdomen is soft  There is no mass  Tenderness: There is no abdominal tenderness  There is no right CVA tenderness, left CVA tenderness, guarding or rebound  Hernia: No hernia is present  Genitourinary:     Penis: Normal        Testes: Normal       Comments: Normal circumcised phallus, testicles are descended bilaterally and are somewhat larger than normal   No masses  I cannot feel a varicocele  Musculoskeletal:         General: Normal range of motion  Cervical back: Normal range of motion  Skin:     Coloration: Skin is not jaundiced or pale  Neurological:      General: No focal deficit present  Mental Status: He is alert and oriented to person, place, and time  Psychiatric:         Mood and Affect: Mood normal          Behavior: Behavior normal          Thought Content: Thought content normal          Judgment: Judgment normal              Current Medications   No current outpatient medications on file          Eren Ratliff MD

## 2023-06-12 NOTE — LETTER
2023     [place]  [facility] [address]    Patient: Taylor Paulino   YOB: 1975   Date of Visit: 2023       Dear Dr Ricardo Corbin: Thank you for referring Bradendilcia Rodriguezn to me for evaluation  Below are my notes for this consultation  If you have questions, please do not hesitate to call me  I look forward to following your patient along with you  Sincerely,        Renetta Walden MD        CC: No Recipients    Renetta Walden MD  2023  3:33 PM  Sign when Signing Visit  [address] [facility]  [place] [phone]  www  [facility]  org      NAME: Taylor Paulino  AGE: 52 y o  SEX: male  : 1975   MRN: [id number]    DATE: 2023  TIME: 3:25 PM    Assessment and Plan:    Loss of libido after inguinal hernia repair, along with some erectile dysfunction  However he is able to perform especially 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  Structurally is normal and his lab work basically is normal with his testosterone being low normal   I believe this is more psychological than physiologic but if he should become interested in wishes to try testosterone replacement we can do that  Discussed the possibility of counseling and that option  Otherwise recommend prostate cancer screening at age 48 and we will see him as needed  Chief Complaint     Chief Complaint   Patient presents with   • Follow-up     U/S results 23       History of Present Illness   27-year-old man, established patient but new to me-was seen by Mr Bobby Toussaint May 9, 2023 for low testosterone, with 325 2023, 355 May 1, 2023  CBC, CMP A1c FSH and LH and prolactin all within normal limits  He has decreased libido and erectile dysfunction    The patient felt that his low testosterone may be from his history of a varicocele  He has chronic intermittent left groin pain status post left inguinal hernia pair about 1 year ago  Also has a history of varicocele and hydrocele about 15 years ago  After the hernia surgery, as when he developed the low libido and erectile dysfunction  He can still penetrate it is not like what it used to be  He feels that his libido and overall desire for sexual activity simply faded away after the surgery  No interest in sexual activity whatsoever  Ultrasound of the scrotum May 16, 2023 shows 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  There is a left-sided varicocele  Very small hydroceles bilaterally right greater than left  Nothing concerning  He has had a varicocele for most of his life  The following portions of the patient's history were reviewed and updated as appropriate: allergies, current medications, past family history, past medical history, past social history, past surgical history and problem list   History reviewed  No pertinent past medical history  Past Surgical History:   Procedure Laterality Date   • HERNIA REPAIR     • NO PAST SURGERIES     • [place] RPR 1ST INGUN HRNA AGE 5 YRS/> REDUCIBLE Left 03/01/2022    Procedure: REPAIR HERNIA INGUINAL WITH MESH;  Surgeon: Bob Ortega DO;  Location: [facility];  Service: General     shoulder  Review of Systems   Review of Systems   Constitutional: Negative for unexpected weight change  Has felt chronic fatigue for years  Genitourinary: Negative  Neurological: Negative for dizziness and headaches         Active Problem List     Patient Active Problem List   Diagnosis   • Mixed hyperlipidemia   • Numbness and tingling in both hands   • Vitamin D deficiency   • History of gout   • Left inguinal hernia   • Acute drug-induced gout of right shoulder   • Chronic right shoulder pain   • Calcific tendinitis of right shoulder   • BMI 30 0-30 9,adult   • Varicose veins of both lower extremities with inflammation "  • Cellulitis of left lower extremity   • Erectile dysfunction   • BMI 29 0-29 9,adult   • Left inguinal pain       Objective   /70 (BP Location: Left arm, Patient Position: Sitting, Cuff Size: Adult)   Ht 6' 1\" (1 854 m)   Wt 98 kg (216 lb)   BMI 28 50 kg/m²     Physical Exam  Vitals reviewed  Constitutional:       Appearance: Normal appearance  He is normal weight  HENT:      Head: Normocephalic and atraumatic  Eyes:      Extraocular Movements: Extraocular movements intact  Pulmonary:      Effort: Pulmonary effort is normal    Abdominal:      General: There is no distension  Palpations: Abdomen is soft  There is no mass  Tenderness: There is no abdominal tenderness  There is no right CVA tenderness, left CVA tenderness, guarding or rebound  Hernia: No hernia is present  Genitourinary:     Penis: Normal        Testes: Normal       Comments: Normal circumcised phallus, testicles are descended bilaterally and are somewhat larger than normal   No masses  I cannot feel a varicocele  Musculoskeletal:         General: Normal range of motion  Cervical back: Normal range of motion  Skin:     Coloration: Skin is not jaundiced or pale  Neurological:      General: No focal deficit present  Mental Status: He is alert and oriented to person, place, and time  Psychiatric:         Mood and Affect: Mood normal          Behavior: Behavior normal          Thought Content: Thought content normal          Judgment: Judgment normal              Current Medications   No current outpatient medications on file          Nabil Ordoñez MD        "

## 2023-08-29 ENCOUNTER — VBI (OUTPATIENT)
Dept: ADMINISTRATIVE | Facility: OTHER | Age: 48
End: 2023-08-29

## 2023-11-09 ENCOUNTER — VBI (OUTPATIENT)
Dept: ADMINISTRATIVE | Facility: OTHER | Age: 48
End: 2023-11-09

## 2025-02-04 ENCOUNTER — OFFICE VISIT (OUTPATIENT)
Dept: INTERNAL MEDICINE CLINIC | Facility: CLINIC | Age: 50
End: 2025-02-04
Payer: COMMERCIAL

## 2025-02-04 VITALS
WEIGHT: 215.1 LBS | HEART RATE: 60 BPM | HEIGHT: 73 IN | DIASTOLIC BLOOD PRESSURE: 76 MMHG | OXYGEN SATURATION: 97 % | BODY MASS INDEX: 28.51 KG/M2 | TEMPERATURE: 97.9 F | SYSTOLIC BLOOD PRESSURE: 119 MMHG

## 2025-02-04 DIAGNOSIS — R21 RASH: ICD-10-CM

## 2025-02-04 DIAGNOSIS — M67.40 GANGLION CYST: Primary | ICD-10-CM

## 2025-02-04 DIAGNOSIS — Z12.11 SCREENING FOR COLON CANCER: ICD-10-CM

## 2025-02-04 DIAGNOSIS — Z23 ENCOUNTER FOR IMMUNIZATION: ICD-10-CM

## 2025-02-04 PROBLEM — L03.116 CELLULITIS OF LEFT LOWER EXTREMITY: Status: RESOLVED | Noted: 2021-09-15 | Resolved: 2025-02-04

## 2025-02-04 PROCEDURE — 90471 IMMUNIZATION ADMIN: CPT | Performed by: NURSE PRACTITIONER

## 2025-02-04 PROCEDURE — 99214 OFFICE O/P EST MOD 30 MIN: CPT | Performed by: NURSE PRACTITIONER

## 2025-02-04 PROCEDURE — 90656 IIV3 VACC NO PRSV 0.5 ML IM: CPT | Performed by: NURSE PRACTITIONER

## 2025-02-04 NOTE — PROGRESS NOTES
"Name: Jose J Connors      : 1975      MRN: [id number]  Encounter Provider: DADA Sal  Encounter Date: 2025   Encounter department: [facility]  :  Assessment & Plan  Ganglion cyst    Orders:    Ambulatory Referral to Podiatry; Future    Will refer to Podiatry  Rash    Orders:    Ambulatory Referral to Dermatology; Future    Will refer to Derm  Screening for colon cancer    Orders:    Cologuard    Will resend cologuard and bring back for his wellness.  Encounter for immunization    Orders:    influenza vaccine preservative-free 0.5 mL IM (Fluzone, Afluria, Fluarix, Flulaval)           History of Present Illness   Jose J is for an acute visit. He has a cyst on his right great toe. He has seen Podiatry in the past and they would drain it but it keeps coming back. He is having pain and usually lances this at home. He also does have a rash to the back of his right thigh and it has been there for some time and is very itchy. She is willing to get a flu vaccine. He is willing to do a cologuard. He offers no other issues.      Review of Systems   Skin:  Positive for rash.   All other systems reviewed and are negative.      Objective   /76   Pulse 60   Temp 97.9 °F (36.6 °C)   Ht 6' 1\" (1.854 m)   Wt 97.6 kg (215 lb 1.6 oz)   SpO2 97%   BMI 28.38 kg/m²      Physical Exam  Vitals reviewed.   Constitutional:       Appearance: Normal appearance. He is normal weight.   Cardiovascular:      Rate and Rhythm: Normal rate and regular rhythm.      Pulses: Normal pulses.      Heart sounds: Normal heart sounds.   Pulmonary:      Effort: Pulmonary effort is normal.      Breath sounds: Normal breath sounds.   Musculoskeletal:         General: Normal range of motion.   Skin:     General: Skin is warm and dry.      Capillary Refill: Capillary refill takes less than 2 seconds.      Comments: Cyst noted to right great toe   Neurological:      General: No focal deficit " present.      Mental Status: He is alert and oriented to person, place, and time. Mental status is at baseline.   Psychiatric:         Mood and Affect: Mood normal.         Behavior: Behavior normal.         Thought Content: Thought content normal.         Judgment: Judgment normal.

## 2025-03-04 ENCOUNTER — APPOINTMENT (OUTPATIENT)
Dept: RADIOLOGY | Facility: CLINIC | Age: 50
End: 2025-03-04
Payer: COMMERCIAL

## 2025-03-04 ENCOUNTER — OFFICE VISIT (OUTPATIENT)
Dept: PODIATRY | Facility: CLINIC | Age: 50
End: 2025-03-04
Payer: COMMERCIAL

## 2025-03-04 VITALS — BODY MASS INDEX: 28.49 KG/M2 | HEIGHT: 73 IN | WEIGHT: 215 LBS

## 2025-03-04 DIAGNOSIS — M67.40 GANGLION CYST: ICD-10-CM

## 2025-03-04 DIAGNOSIS — M79.672 PAIN IN LEFT FOOT: ICD-10-CM

## 2025-03-04 DIAGNOSIS — M79.675 PAIN IN TOE OF LEFT FOOT: ICD-10-CM

## 2025-03-04 DIAGNOSIS — M72.2 PLANTAR FASCIITIS: Primary | ICD-10-CM

## 2025-03-04 PROCEDURE — 99203 OFFICE O/P NEW LOW 30 MIN: CPT | Performed by: PODIATRIST

## 2025-03-04 PROCEDURE — 73630 X-RAY EXAM OF FOOT: CPT

## 2025-03-04 NOTE — PROGRESS NOTES
Name: Jose J Connors      : 1975      MRN: [id number]  Encounter Provider: Kwabena George DPM  Encounter Date: 3/4/2025   Encounter department: [facility] PODIATRY [place]  :  Assessment & Plan  Ganglion cyst    Orders:    Ambulatory Referral to Podiatry    XR foot 3+ vw left; Future  Explained to the patient that there is a high recurrence rate of cysts or ganglion on when they are removed surgically.  Patient is to refrain from doing his own surgery to remove the cyst.  Referred the patient to Dr. Walters for surgical consultation.  Plantar fasciitis       Plantar Fasciitis Treatment Plan:     Rest, ice  10-15 minutes per day may use frozen water bottle on ground while seated to rub along the plantar fascia.  Stretching exercises 2x per day, everyday.  Anti-inflammatory medications (If no contraindications to take)  Orthotics and supportive shoes-patient is okay to continue using his Birkenstock sandals since the arch molded to the foot and the patient also goes through the Birkenstocks approximately every 6 months to get into new pair.     No response to above treatments:     Injection  Formal physical therapy       Failure of conservative treatment  Surgery     I personally discussed with patient at the visit today:     The diagnosis of this encounter  2.   Possible etiologies of the diagnosis  3.   Treatment options including advantages and disadvantages of treatment with potential risks and complications associated with these treatments  4.   Prevention strategies and ways to decrease pain     I answered all of the patients questions.    Pain in toe of left foot         Pain in left foot           Return if symptoms worsen or fail to improve.     History of Present Illness   HPI  Jose J Connors is a 49 y.o. male who presents with chief complaint of a painful cyst on the outside part of his left big and painful arch also on his foot.  Patient relates that he has had this cyst for some time  "he did see another podiatrist who lanced it and then cauterized the central portion of the cyst.  According to the patient the cyst returned numerous times and the patient started doing his own surgery to remove it and lancing it with a hot needle and then closing the cyst down with a cigarette lighter.  Patient relates that he has not sought any treatment for the plantar fasciitis on the left foot.  History obtained from: patient    Review of Systems  Medical History Reviewed by provider this encounter:     .  No current outpatient medications on file prior to visit.     No current facility-administered medications on file prior to visit.      Social History     Tobacco Use    Smoking status: Never    Smokeless tobacco: Never   Vaping Use    Vaping status: Never Used   Substance and Sexual Activity    Alcohol use: Yes     Comment: 5x week- beer    Drug use: Yes     Types: Marijuana     Comment: occassional, used last week     Sexual activity: Not on file        Objective   Ht 6' 1\" (1.854 m)   Wt 97.5 kg (215 lb)   BMI 28.37 kg/m²     Personally reviewed the x-rays that were taken today that show no gross pathology in the area of the heel.  There appears to be a small calcaneal spur noted on the medial side of the calcaneus but no breaks in the spur location.  There also appears to be a accessory bone near the navicular.    Physical Exam  Vascular status is 2/4 DP PT negative digital hair, normal distal cooling, and immediate capillary refill left extremity.  Capillary refill is approximately 2 seconds.    Derm there is a cyst with a clear center lesion noted on the medial aspect of the first IP A.  The cyst measures on the surface approximately 0.4 x 0.4 cm.  There is a small amount of hypertrophic tissue present on the surface.  There is pain upon palpation.  No signs of any erythema surrounding the cyst and no signs of any infection.    Ortho there is pain upon palpation of the medial tubercle of the calcaneus " and along the medial plantar fascial band.  No pain with palpation of the tarsal tunnel and no pain with palpation of the center or lateral bands.  There is good range of motion of the first IPJ.    Neuro is intact for light touch on the left extremity.    Administrative Statements   I have spent a total time of 15 minutes in caring for this patient on the day of the visit/encounter including Risks and benefits of tx options, Instructions for management, Patient and family education, Importance of tx compliance, Risk factor reductions, Counseling / Coordination of care, Documenting in the medical record, and Obtaining or reviewing history  .

## 2025-03-04 NOTE — ASSESSMENT & PLAN NOTE
Orders:    Ambulatory Referral to Podiatry    XR foot 3+ vw left; Future  Explained to the patient that there is a high recurrence rate of cysts or ganglion on when they are removed surgically.  Patient is to refrain from doing his own surgery to remove the cyst.  Referred the patient to Dr. Walters for surgical consultation.

## 2025-03-06 ENCOUNTER — PREP FOR PROCEDURE (OUTPATIENT)
Age: 50
End: 2025-03-06

## 2025-03-06 ENCOUNTER — OFFICE VISIT (OUTPATIENT)
Age: 50
End: 2025-03-06
Payer: COMMERCIAL

## 2025-03-06 VITALS — WEIGHT: 215 LBS | BODY MASS INDEX: 28.49 KG/M2 | HEIGHT: 73 IN

## 2025-03-06 DIAGNOSIS — M79.675 PAIN OF TOE OF LEFT FOOT: ICD-10-CM

## 2025-03-06 DIAGNOSIS — M67.40 MUCOID CYST OF JOINT: Primary | ICD-10-CM

## 2025-03-06 DIAGNOSIS — M67.40 GANGLION CYST: Primary | ICD-10-CM

## 2025-03-06 PROCEDURE — 99214 OFFICE O/P EST MOD 30 MIN: CPT | Performed by: PODIATRIST

## 2025-03-06 RX ORDER — CHLORHEXIDINE GLUCONATE ORAL RINSE 1.2 MG/ML
15 SOLUTION DENTAL ONCE
OUTPATIENT
Start: 2025-03-06 | End: 2025-03-06

## 2025-03-06 RX ORDER — CHLORHEXIDINE GLUCONATE 40 MG/ML
SOLUTION TOPICAL DAILY PRN
OUTPATIENT
Start: 2025-03-06

## 2025-03-06 NOTE — PROGRESS NOTES
Name: Jose J Connors      : 1975      MRN: [id number]  Encounter Provider: Clovis Walters DPM  Encounter Date: 3/6/2025   Encounter department: [facility] PODIATRY [place]  :  Assessment & Plan  Mucoid cyst of joint         Pain of toe of left foot         -We discussed at length that this is a mucoid cyst and also a ganglion cyst which is the same thing  - We discussed that definitive treatment here is to fuse the H IPJ to prevent the joint from recreating the ganglion cyst  - As the fusion procedure is more invasive we will plan for isolated cyst resection with attempting to cauterize and basically tie off the stock  - We discussed that the recurrence rate of these are rather high around 40%  - If this does recur he may benefit from H IPJ fusion in the future.  All questions comments answered    Patient was counseled and educated on the condition and the diagnosis. The diagnosis, treatment options and prognosis were discussed with the patient.  The patient failed conservative care at this time and wished to proceed with surgical treatment.  Explained surgical details and post-op course.  Discussed all risks and complications related to the patient's condition and surgery.  The benefits of surgery were also discussed.  The patient understood that the surgery would not guarantee desirable outcome.  All questions and concerns were addressed and the consent was signed.        History of Present Illness   HPI  Jose J Connors is a 49 y.o. male who presents evaluation and management of his left foot, he states that he has had the cyst on his big toe for a while, he has attempted to manage it himself and has done it decently successfully but keeps coming back.  Comes back around 4 weeks.  He would like to have it gone      Review of Systems   Constitutional:  Negative for chills and fever.   HENT:  Negative for ear pain and sore throat.    Eyes:  Negative for pain and visual  "disturbance.   Respiratory:  Negative for cough and shortness of breath.    Cardiovascular:  Negative for chest pain and palpitations.   Gastrointestinal:  Negative for abdominal pain and vomiting.   Genitourinary:  Negative for dysuria and hematuria.   Musculoskeletal:  Negative for arthralgias and back pain.   Skin:  Negative for color change and rash.   Neurological:  Negative for seizures and syncope.   All other systems reviewed and are negative.         Objective   Ht 6' 1\" (1.854 m)   Wt 97.5 kg (215 lb)   BMI 28.37 kg/m²      Physical Exam  Vitals reviewed.   Constitutional:       Appearance: Normal appearance.   HENT:      Head: Normocephalic and atraumatic.      Nose: Nose normal.      Mouth/Throat:      Mouth: Mucous membranes are moist.   Eyes:      Pupils: Pupils are equal, round, and reactive to light.   Cardiovascular:      Pulses: Normal pulses.   Pulmonary:      Effort: Pulmonary effort is normal.   Musculoskeletal:         General: Swelling and tenderness present.      Comments: He does have positive varicosities along the bilateral feet, and his DP and PT pulses are palpable.  He does have a well-circumscribed cyst on the dorsal medial aspect of his left H IPJ, consistent with mucoid cyst.  Sanguinous base.   Skin:     Capillary Refill: Capillary refill takes less than 2 seconds.   Neurological:      General: No focal deficit present.      Mental Status: He is alert. Mental status is at baseline.           "

## 2025-03-17 LAB — COLOGUARD RESULT REPORTABLE: NEGATIVE

## 2025-03-18 ENCOUNTER — RESULTS FOLLOW-UP (OUTPATIENT)
Dept: INTERNAL MEDICINE CLINIC | Facility: CLINIC | Age: 50
End: 2025-03-18

## 2025-03-19 ENCOUNTER — TELEPHONE (OUTPATIENT)
Age: 50
End: 2025-03-19

## 2025-03-19 NOTE — TELEPHONE ENCOUNTER
Lilibeth, the surgical scheduler with [facility] called asking if the office can reach out to the patient to schedule a pre op appt.  His surgery, an excision ganglion cyst of his left to is scheduled for April 4.  She said no preadmission testing is required.

## 2025-03-24 NOTE — TELEPHONE ENCOUNTER
Hi good morning. Patient is scheduled for sx next week 4/4 and still has not completed a pre-op. Can you please reach out to patient again about getting scheduled? Thank you

## 2025-04-07 ENCOUNTER — VBI (OUTPATIENT)
Dept: ADMINISTRATIVE | Facility: OTHER | Age: 50
End: 2025-04-07

## 2025-04-07 NOTE — TELEPHONE ENCOUNTER
04/07/25 9:28 AM     Chart reviewed for CRC: Colonoscopy was/were submitted to the patient's insurance.     Susan Wilcox MA   [facility]

## 2025-05-27 ENCOUNTER — TELEPHONE (OUTPATIENT)
Age: 50
End: 2025-05-27

## 2025-05-27 NOTE — TELEPHONE ENCOUNTER
Pt called and stated the the dermatologist prescribed bactrim and he was given to much and had a reaction to it, having a fever terrible headache and urine was dark. He is drinking a lot of water as to flush out his kidneys and is asking if there is anything else that he can do to help his kidneys recover? He had blood work and urine test done through [employer] and will fax over as soon as he receives the results.

## 2025-05-27 NOTE — TELEPHONE ENCOUNTER
He would have to call derm back as they did give him the bactrim what does he mean he was given too much of it??

## 2025-05-28 ENCOUNTER — APPOINTMENT (EMERGENCY)
Dept: CT IMAGING | Facility: HOSPITAL | Age: 50
End: 2025-05-28
Payer: COMMERCIAL

## 2025-05-28 ENCOUNTER — OFFICE VISIT (OUTPATIENT)
Dept: INTERNAL MEDICINE CLINIC | Facility: CLINIC | Age: 50
End: 2025-05-28
Payer: COMMERCIAL

## 2025-05-28 ENCOUNTER — HOSPITAL ENCOUNTER (EMERGENCY)
Facility: HOSPITAL | Age: 50
Discharge: HOME | End: 2025-05-28
Attending: EMERGENCY MEDICINE
Payer: COMMERCIAL

## 2025-05-28 VITALS
HEART RATE: 61 BPM | OXYGEN SATURATION: 99 % | TEMPERATURE: 97.8 F | DIASTOLIC BLOOD PRESSURE: 79 MMHG | HEIGHT: 73 IN | BODY MASS INDEX: 28.49 KG/M2 | RESPIRATION RATE: 17 BRPM | WEIGHT: 215 LBS | SYSTOLIC BLOOD PRESSURE: 121 MMHG

## 2025-05-28 VITALS — OXYGEN SATURATION: 98 % | HEART RATE: 72 BPM | TEMPERATURE: 97.9 F

## 2025-05-28 DIAGNOSIS — R51.9 HEADACHE: Primary | ICD-10-CM

## 2025-05-28 DIAGNOSIS — R50.9 FEVER, UNSPECIFIED FEVER CAUSE: ICD-10-CM

## 2025-05-28 DIAGNOSIS — R51.9 NONINTRACTABLE HEADACHE, UNSPECIFIED CHRONICITY PATTERN, UNSPECIFIED HEADACHE TYPE: Primary | ICD-10-CM

## 2025-05-28 DIAGNOSIS — T50.905A ADVERSE EFFECT OF DRUG, INITIAL ENCOUNTER: ICD-10-CM

## 2025-05-28 DIAGNOSIS — R79.89 ABNORMAL LFTS: ICD-10-CM

## 2025-05-28 LAB
ALBUMIN SERPL BCG-MCNC: 4 G/DL (ref 3.5–5)
ALP SERPL-CCNC: 81 U/L (ref 34–104)
ALT SERPL W P-5'-P-CCNC: 122 U/L (ref 7–52)
ANION GAP SERPL CALCULATED.3IONS-SCNC: 5 MMOL/L (ref 4–13)
AST SERPL W P-5'-P-CCNC: 71 U/L (ref 13–39)
BASOPHILS # BLD AUTO: 0.05 THOUSANDS/ÂΜL (ref 0–0.1)
BASOPHILS NFR BLD AUTO: 1 % (ref 0–1)
BILIRUB SERPL-MCNC: 0.66 MG/DL (ref 0.2–1)
BUN SERPL-MCNC: 16 MG/DL (ref 5–25)
CALCIUM SERPL-MCNC: 9.3 MG/DL (ref 8.4–10.2)
CHLORIDE SERPL-SCNC: 100 MMOL/L (ref 96–108)
CO2 SERPL-SCNC: 32 MMOL/L (ref 21–32)
CREAT SERPL-MCNC: 0.8 MG/DL (ref 0.6–1.3)
EOSINOPHIL # BLD AUTO: 0.05 THOUSAND/ÂΜL (ref 0–0.61)
EOSINOPHIL NFR BLD AUTO: 1 % (ref 0–6)
ERYTHROCYTE [DISTWIDTH] IN BLOOD BY AUTOMATED COUNT: 12.3 % (ref 11.6–15.1)
GFR SERPL CREATININE-BSD FRML MDRD: 104 ML/MIN/1.73SQ M
GLUCOSE SERPL-MCNC: 102 MG/DL (ref 65–140)
HCT VFR BLD AUTO: 44.2 % (ref 36.5–49.3)
HGB BLD-MCNC: 14.5 G/DL (ref 12–17)
IMM GRANULOCYTES # BLD AUTO: 0.02 THOUSAND/UL (ref 0–0.2)
IMM GRANULOCYTES NFR BLD AUTO: 0 % (ref 0–2)
LYMPHOCYTES # BLD AUTO: 1.63 THOUSANDS/ÂΜL (ref 0.6–4.47)
LYMPHOCYTES NFR BLD AUTO: 27 % (ref 14–44)
MAGNESIUM SERPL-MCNC: 2.3 MG/DL (ref 1.9–2.7)
MCH RBC QN AUTO: 29.5 PG (ref 26.8–34.3)
MCHC RBC AUTO-ENTMCNC: 32.8 G/DL (ref 31.4–37.4)
MCV RBC AUTO: 90 FL (ref 82–98)
MONOCYTES # BLD AUTO: 0.63 THOUSAND/ÂΜL (ref 0.17–1.22)
MONOCYTES NFR BLD AUTO: 11 % (ref 4–12)
NEUTROPHILS # BLD AUTO: 3.59 THOUSANDS/ÂΜL (ref 1.85–7.62)
NEUTS SEG NFR BLD AUTO: 60 % (ref 43–75)
NRBC BLD AUTO-RTO: 0 /100 WBCS
PLATELET # BLD AUTO: 311 THOUSANDS/UL (ref 149–390)
PMV BLD AUTO: 8.5 FL (ref 8.9–12.7)
POTASSIUM SERPL-SCNC: 4.3 MMOL/L (ref 3.5–5.3)
PROT SERPL-MCNC: 7.1 G/DL (ref 6.4–8.4)
RBC # BLD AUTO: 4.92 MILLION/UL (ref 3.88–5.62)
SODIUM SERPL-SCNC: 137 MMOL/L (ref 135–147)
WBC # BLD AUTO: 5.97 THOUSAND/UL (ref 4.31–10.16)

## 2025-05-28 PROCEDURE — 96361 HYDRATE IV INFUSION ADD-ON: CPT

## 2025-05-28 PROCEDURE — 80053 COMPREHEN METABOLIC PANEL: CPT | Performed by: EMERGENCY MEDICINE

## 2025-05-28 PROCEDURE — 85025 COMPLETE CBC W/AUTO DIFF WBC: CPT | Performed by: EMERGENCY MEDICINE

## 2025-05-28 PROCEDURE — 99213 OFFICE O/P EST LOW 20 MIN: CPT | Performed by: NURSE PRACTITIONER

## 2025-05-28 PROCEDURE — 99284 EMERGENCY DEPT VISIT MOD MDM: CPT

## 2025-05-28 PROCEDURE — 99284 EMERGENCY DEPT VISIT MOD MDM: CPT | Performed by: EMERGENCY MEDICINE

## 2025-05-28 PROCEDURE — 96374 THER/PROPH/DIAG INJ IV PUSH: CPT

## 2025-05-28 PROCEDURE — 70450 CT HEAD/BRAIN W/O DYE: CPT

## 2025-05-28 PROCEDURE — 36415 COLL VENOUS BLD VENIPUNCTURE: CPT | Performed by: EMERGENCY MEDICINE

## 2025-05-28 PROCEDURE — 86618 LYME DISEASE ANTIBODY: CPT | Performed by: EMERGENCY MEDICINE

## 2025-05-28 PROCEDURE — 83735 ASSAY OF MAGNESIUM: CPT | Performed by: EMERGENCY MEDICINE

## 2025-05-28 RX ORDER — KETOROLAC TROMETHAMINE 30 MG/ML
15 INJECTION, SOLUTION INTRAMUSCULAR; INTRAVENOUS ONCE
Status: COMPLETED | OUTPATIENT
Start: 2025-05-28 | End: 2025-05-28

## 2025-05-28 RX ORDER — SULFAMETHOXAZOLE AND TRIMETHOPRIM 800; 160 MG/1; MG/1
1 TABLET ORAL EVERY 12 HOURS SCHEDULED
COMMUNITY
Start: 2025-05-07

## 2025-05-28 RX ADMIN — KETOROLAC TROMETHAMINE 15 MG: 30 INJECTION, SOLUTION INTRAMUSCULAR at 13:38

## 2025-05-28 RX ADMIN — SODIUM CHLORIDE 1000 ML: 0.9 INJECTION, SOLUTION INTRAVENOUS at 12:54

## 2025-05-28 NOTE — DISCHARGE INSTRUCTIONS
Overall your testing and evaluation was reassuring. I recommend to discontinue the bactrim and watch and wait on your symptoms. If symptoms worsen or if new symptoms develop, return for further evaluation.    Your liver tests were slightly elevated; follow up with your PCP

## 2025-05-28 NOTE — ED PROVIDER NOTES
Time reflects when diagnosis was documented in both MDM as applicable and the Disposition within this note       Time User Action Codes Description Comment    5/28/2025  1:56 PM Kwabena Velasco [R51.9] Headache     5/28/2025  1:57 PM Kwabena Velasco [T50.905A] Adverse effect of drug, initial encounter     5/28/2025  2:11 PM Kwabena Velasco [R79.89] Abnormal LFTs           ED Disposition       ED Disposition   Discharge    Condition   Stable    Date/Time   Wed May 28, 2025  1:56 PM    Comment   Jose J Connors discharge to home/self care.                   Assessment & Plan       Medical Decision Making  49-year-old male with no significant past medical history presents with headache  He was seen by a dermatologist in February for a shave biopsy, then had infection on his leg afterwards several weeks ago. Started on bactrim, oral prednisone for 5 days on 5/7. Vacationed 5/12 in [place]. Started with a headache 5/21. Symptoms have persisted for the last 7 days.    Headache is temporal, right sided.  Tenderness along the right trapezius musculature.  Patient expresses concern for aseptic meningitis secondary to Bactrim.  Patient stopped his Bactrim on 5/24. Has not have any fevers since then.    Clinically he is well appearing and does not appear ill. He is sitting upright in bed and speaking in full sentences with a  GCS of 15. He has good range of motion of his neck but somewhat limited to contralateral rotation secondary to pain at his trapezius. His neurological exam is otherwise unremarkable. He has taken no antipyretics today.    Exam is not consistent with bacterial meningitis or viral meningitis. Afebrile, well appearing, pain with ROM of neck but not meningeal. Aseptic meningitis would be exceedingly rare in the setting of unilateral head and neck pain.    Problems Addressed:  Abnormal LFTs: undiagnosed new problem with uncertain prognosis  Adverse effect of drug, initial encounter: acute  illness or injury  Headache: acute illness or injury    Amount and/or Complexity of Data Reviewed  Labs: ordered. Decision-making details documented in ED Course.  Radiology: ordered and independent interpretation performed. Decision-making details documented in ED Course.    Risk  Prescription drug management.        ED Course as of 05/29/25 0828   Wed May 28, 2025   0825 Labs overall reassuring, slight increase in LFTs from yesterday.  Patient can follow-up with his PCP who I did discuss the case with.  Doubt aseptic meningitis secondary to Bactrim use in this patient as his symptoms are unilateral, he is well-appearing, most cases resolve within 24 hours of Bactrim cessation however may take 1 to 2 weeks.  Offered patient lumbar puncture for definitive diagnosis to rule out however does carry theoretical risks of bacterial inoculation, nerve damage, spinal cord damage etc.  Is also unlikely to change his management.  After discussion patient agrees to forego this test however he can return if symptoms worsen or if new symptoms develop   1328 CT Head negative for hemorrhage on my interpretation       Medications   sodium chloride 0.9 % bolus 1,000 mL (0 mL Intravenous Stopped 5/28/25 1400)   ketorolac (TORADOL) injection 15 mg (15 mg Intravenous Given 5/28/25 1338)       ED Risk Strat Scores                    No data recorded        SBIRT 22yo+      Flowsheet Row Most Recent Value   Initial Alcohol Screen: US AUDIT-C     1. How often do you have a drink containing alcohol? 0 Filed at: 05/28/2025 1157   2. How many drinks containing alcohol do you have on a typical day you are drinking?  0 Filed at: 05/28/2025 1157   3a. Male UNDER 65: How often do you have five or more drinks on one occasion? 0 Filed at: 05/28/2025 1157   Audit-C Score 0 Filed at: 05/28/2025 1157   SEBASTI[place]N: How many times in the past year have you...    Used an illegal drug or used a prescription medication for non-medical reasons? Never Filed at:  05/28/2025 1157                            History of Present Illness       Chief Complaint   Patient presents with    Headache     Pt reports that he was on sulfamethoxazole for a leg infection, pt was given 40 pills and believes he took it for two long. Pt is now worried with his neck pain and headaches he has bacterial meningitis, pt did go to [facility] for blood work yesterday.       Past Medical History[1]   Past Surgical History[2]   Family History[3]   Social History[4]   E-Cigarette/Vaping    E-Cigarette Use Never User       E-Cigarette/Vaping Substances    Nicotine No     THC No     CBD No     Flavoring No     Other No     Unknown No       I have reviewed and agree with the history as documented.     Patient with right-sided headache.  See [facility] for thorough history      Headache      Review of Systems   Neurological:  Positive for headaches.           Objective       ED Triage Vitals   Temperature Pulse Blood Pressure Respirations SpO2 Patient Position - Orthostatic VS   05/28/25 1154 05/28/25 1154 05/28/25 1154 05/28/25 1154 05/28/25 1154 05/28/25 1154   97.8 °F (36.6 °C) 64 140/93 18 99 % Lying      Temp Source Heart Rate Source BP Location FiO2 (%) Pain Score    05/28/25 1154 05/28/25 1417 05/28/25 1154 -- 05/28/25 1154    Temporal Monitor Right arm  8      Vitals      Date and Time Temp Pulse SpO2 Resp BP Pain Score FACES Pain Rating User   05/28/25 1417 97.8 °F (36.6 °C) 61 99 % 17 121/79 -- -- AMF   05/28/25 1338 -- -- -- -- -- 7 -- AMF   05/28/25 1156 -- -- 99 % -- -- -- --    05/28/25 1154 97.8 °F (36.6 °C) 64 99 % 18 140/93 8 --             Physical Exam  Vitals and nursing note reviewed.   Constitutional:       Appearance: Normal appearance. He is well-developed.   HENT:      Head: Normocephalic and atraumatic.      Comments: Tenderness in the right trapezius/cervical musculature    Eyes:      Conjunctiva/sclera: Conjunctivae normal.      Pupils: Pupils are equal, round, and reactive to  light.     Neck:      Trachea: No tracheal deviation.     Cardiovascular:      Rate and Rhythm: Normal rate and regular rhythm.      Heart sounds: Normal heart sounds. No murmur heard.  Pulmonary:      Effort: Pulmonary effort is normal. No respiratory distress.      Breath sounds: Normal breath sounds. No wheezing or rales.   Abdominal:      General: Bowel sounds are normal. There is no distension.      Palpations: Abdomen is soft.      Tenderness: There is no abdominal tenderness.     Musculoskeletal:         General: No deformity.      Cervical back: Normal range of motion and neck supple.      Comments: Tenderness right trapezius     Skin:     General: Skin is warm and dry.      Capillary Refill: Capillary refill takes less than 2 seconds.     Neurological:      General: No focal deficit present.      Mental Status: He is alert and oriented to person, place, and time.      Sensory: No sensory deficit.     Psychiatric:         Mood and Affect: Mood normal.         Judgment: Judgment normal.         Results Reviewed       Procedure Component Value Units Date/Time    Comprehensive metabolic panel [[id number]]  (Abnormal) Collected: 05/28/25 1253    Lab Status: Final result Specimen: Blood from Arm, Right Updated: 05/28/25 1320     Sodium 137 mmol/L      Potassium 4.3 mmol/L      Chloride 100 mmol/L      CO2 32 mmol/L      ANION GAP 5 mmol/L      BUN 16 mg/dL      Creatinine 0.80 mg/dL      Glucose 102 mg/dL      Calcium 9.3 mg/dL      AST 71 U/L       U/L      Alkaline Phosphatase 81 U/L      Total Protein 7.1 g/dL      Albumin 4.0 g/dL      Total Bilirubin 0.66 mg/dL      eGFR 104 ml/min/1.73sq m     Narrative:      National Kidney Disease Foundation guidelines for Chronic Kidney Disease (CKD):     Stage 1 with normal or high GFR (GFR > 90 mL/min/1.73 square meters)    Stage 2 Mild CKD (GFR = 60-89 mL/min/1.73 square meters)    Stage 3A Moderate CKD (GFR = 45-59 mL/min/1.73 square meters)    Stage 3B  Moderate CKD (GFR = 30-44 mL/min/1.73 square meters)    Stage 4 Severe CKD (GFR = 15-29 mL/min/1.73 square meters)    Stage 5 End Stage CKD (GFR <15 mL/min/1.73 square meters)  Note: GFR calculation is accurate only with a steady state creatinine    Magnesium [[id number]]  (Normal) Collected: 05/28/25 1253    Lab Status: Final result Specimen: Blood from Arm, Right Updated: 05/28/25 1320     Magnesium 2.3 mg/dL     CBC and differential [[id number]]  (Abnormal) Collected: 05/28/25 1253    Lab Status: Final result Specimen: Blood from Arm, Right Updated: 05/28/25 1303     WBC 5.97 Thousand/uL      RBC 4.92 Million/uL      Hemoglobin 14.5 g/dL      Hematocrit 44.2 %      MCV 90 fL      MCH 29.5 pg      MCHC 32.8 g/dL      RDW 12.3 %      MPV 8.5 fL      Platelets 311 Thousands/uL      nRBC 0 /100 WBCs      Segmented % 60 %      Immature Grans % 0 %      Lymphocytes % 27 %      Monocytes % 11 %      Eosinophils Relative 1 %      Basophils Relative 1 %      Absolute Neutrophils 3.59 Thousands/µL      Absolute Immature Grans 0.02 Thousand/uL      Absolute Lymphocytes 1.63 Thousands/µL      Absolute Monocytes 0.63 Thousand/µL      Eosinophils Absolute 0.05 Thousand/µL      Basophils Absolute 0.05 Thousands/µL     LYME TOTAL AB W REFLEX TO IGM/IGG [[id number]] Collected: 05/28/25 1253    Lab Status: In process Specimen: Blood from Arm, Right Updated: 05/28/25 1257    Narrative:      The following orders were created for panel order LYME TOTAL AB W REFLEX TO IGM/IGG.  Procedure                               Abnormality         Status                     ---------                               -----------         ------                     Lyme Total AB W Reflex t...[[id number]]                      In process                   Please view results for these tests on the individual orders.    Lyme Total AB W Reflex to IGM/IGG [[id number]] Collected: 05/28/25 1253    Lab Status: In process Specimen: Blood from Arm, Right Updated:  05/28/25 1257            CT head without contrast   Final Interpretation by Santino Quintero DO (05/28 1324)      No acute intracranial abnormality.                  Workstation performed: [id number]             Procedures    ED Medication and Procedure Management   Prior to Admission Medications   Prescriptions Last Dose Informant Patient Reported? Taking?   sulfamethoxazole-trimethoprim (BACTRIM DS) 800-160 mg per tablet 5/24/2025  Yes Yes   Sig: Take 1 tablet by mouth every 12 (twelve) hours      Facility-Administered Medications: None     Discharge Medication List as of 5/28/2025  2:15 PM        CONTINUE these medications which have NOT CHANGED    Details   sulfamethoxazole-trimethoprim (BACTRIM DS) 800-160 mg per tablet Take 1 tablet by mouth every 12 (twelve) hours, Starting Wed 5/7/2025, Historical Med             ED SEPSIS DOCUMENTATION   Time reflects when diagnosis was documented in both MDM as applicable and the Disposition within this note       Time User Action Codes Description Comment    5/28/2025  1:56 PM Kwabena Velasco [R51.9] Headache     5/28/2025  1:57 PM Kwabena Velasco [T50.905A] Adverse effect of drug, initial encounter     5/28/2025  2:11 PM Kwabena Velasco [R79.89] Abnormal LFTs                      [1] No past medical history on file.  [2]   Past Surgical History:  Procedure Laterality Date    HERNIA REPAIR  2021    NO PAST SURGERIES      DC RPR 1ST INGUN HRNA AGE 5 YRS/> REDUCIBLE Left 03/01/2022    Procedure: REPAIR HERNIA INGUINAL WITH MESH;  Surgeon: Alex Saravia DO;  Location: [facility];  Service: General   [3]   Family History  Problem Relation Name Age of Onset    Gout Father      No Known Problems Mother      Gout Brother     [4]   Social History  Tobacco Use    Smoking status: Never    Smokeless tobacco: Never   Vaping Use    Vaping status: Never Used   Substance Use Topics    Alcohol use: Yes     Comment: 5x week- beer    Drug use: Yes     Types:  Marijuana     Comment: occassional, used last week         Kwabena Velasco, DO  05/29/25 0893     wheelchair

## 2025-05-28 NOTE — ASSESSMENT & PLAN NOTE
Due to symptoms and concerns I did advise patient go to ER for further testing and imaging. Patient is going to [facility]

## 2025-05-28 NOTE — PROGRESS NOTES
Name: Jose J Connors      : 1975      MRN: [id number]  Encounter Provider: DADA Sal  Encounter Date: 2025   Encounter department: [facility] [place]  :  Assessment & Plan  Nonintractable headache, unspecified chronicity pattern, unspecified headache type         Fever, unspecified fever cause    Due to symptoms and concerns I did advise patient go to ER for further testing and imaging. Patient is going to [facility]              History of Present Illness   Jose J is for an acute visit. He states he did see [facility] and was told he had a skin infection and was given Bactrim. He states he was given 40 tablets and that it was too much and he thinks he had a toxicity. He did have labs done which he did select on his own with [facility]. He did have a urine and other testing done. He is having a very bad headache and is running fevers. He was on google and thinks he had meningitis. He was given the bactrim on  for 20 days BID. He was also on steroids and bactroban cream. He offers no other issues.      Review of Systems   Constitutional:  Positive for fatigue and fever.   Neurological:  Positive for headaches.   All other systems reviewed and are negative.      Objective   There were no vitals taken for this visit.     Physical Exam  Constitutional:       Appearance: Normal appearance. He is ill-appearing.     Skin:     General: Skin is warm and dry.      Capillary Refill: Capillary refill takes less than 2 seconds.     Neurological:      General: No focal deficit present.      Mental Status: He is alert and oriented to person, place, and time. Mental status is at baseline.     Psychiatric:         Mood and Affect: Mood normal.         Behavior: Behavior normal.         Thought Content: Thought content normal.         Judgment: Judgment normal.

## 2025-05-29 LAB — B BURGDOR IGG+IGM SER QL IA: NEGATIVE

## 2025-06-27 PROBLEM — R50.9 FEVER: Status: RESOLVED | Noted: 2025-05-28 | Resolved: 2025-06-27

## (undated) DEVICE — SYRINGE 10ML LL

## (undated) DEVICE — 3M™ STERI-STRIP™ REINFORCED ADHESIVE SKIN CLOSURES, R1546, 1/4 IN X 4 IN (6 MM X 100 MM), 10 STRIPS/ENVELOPE: Brand: 3M™ STERI-STRIP™

## (undated) DEVICE — SUT MONOCRYL 4-0 PS-2 27 IN Y426H

## (undated) DEVICE — SUT PDS II 1 CT-1 27 IN Z347H

## (undated) DEVICE — CHLORAPREP HI-LITE 26ML ORANGE

## (undated) DEVICE — BULB SYRINGE,IRRIGATION WITH PROTECTIVE CAP: Brand: DOVER

## (undated) DEVICE — GLOVE INDICATOR PI UNDERGLOVE SZ 7 BLUE

## (undated) DEVICE — INTENDED FOR TISSUE SEPARATION, AND OTHER PROCEDURES THAT REQUIRE A SHARP SURGICAL BLADE TO PUNCTURE OR CUT.: Brand: BARD-PARKER SAFETY BLADES SIZE 15, STERILE

## (undated) DEVICE — SUT VICRYL 3-0 SH 27 IN J416H

## (undated) DEVICE — SUT VICRYL 2-0 SH 27 IN UNDYED J417H

## (undated) DEVICE — PAD GROUNDING ADULT

## (undated) DEVICE — GAUZE SPONGES,16 PLY: Brand: CURITY

## (undated) DEVICE — 3M™ TEGADERM™ TRANSPARENT FILM DRESSING FRAME STYLE, 1626W, 4 IN X 4-3/4 IN (10 CM X 12 CM), 50/CT 4CT/CASE: Brand: 3M™ TEGADERM™

## (undated) DEVICE — SWABSTCK, BENZOIN TINCTURE, 1/PK, STRL: Brand: APLICARE

## (undated) DEVICE — PLUMEPEN PRO 10FT

## (undated) DEVICE — BINDER ABDOMINAL 46-62 IN

## (undated) DEVICE — PENROSE DRAIN, 18 X 3 8: Brand: CARDINAL HEALTH

## (undated) DEVICE — GLOVE SRG BIOGEL ECLIPSE 7

## (undated) DEVICE — NEEDLE 25G X 1 1/2

## (undated) DEVICE — TUBING SUCTION 5MM X 12 FT

## (undated) DEVICE — SUT PDS II 0 CT-1 36 IN Z346H

## (undated) DEVICE — BETHLEHEM UNIVERSAL MINOR GEN: Brand: CARDINAL HEALTH

## (undated) DEVICE — MEDI-VAC YANK SUCT HNDL W/TPRD BULBOUS TIP: Brand: CARDINAL HEALTH

## (undated) DEVICE — DRAPE EQUIPMENT RF WAND